# Patient Record
Sex: MALE | Race: WHITE | Employment: OTHER | URBAN - METROPOLITAN AREA
[De-identification: names, ages, dates, MRNs, and addresses within clinical notes are randomized per-mention and may not be internally consistent; named-entity substitution may affect disease eponyms.]

---

## 2023-02-23 ENCOUNTER — HOSPITAL ENCOUNTER (EMERGENCY)
Age: 78
Discharge: HOME | DRG: 071 | End: 2023-02-24
Attending: EMERGENCY MEDICINE
Payer: MEDICARE

## 2023-02-23 ENCOUNTER — APPOINTMENT (OUTPATIENT)
Dept: CT IMAGING | Age: 78
DRG: 071 | End: 2023-02-23
Payer: MEDICARE

## 2023-02-23 VITALS
BODY MASS INDEX: 25.73 KG/M2 | RESPIRATION RATE: 18 BRPM | HEART RATE: 89 BPM | DIASTOLIC BLOOD PRESSURE: 64 MMHG | WEIGHT: 190 LBS | HEIGHT: 72 IN | SYSTOLIC BLOOD PRESSURE: 148 MMHG | OXYGEN SATURATION: 99 % | TEMPERATURE: 98.6 F
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
## 2023-02-23 PROCEDURE — 99284 EMERGENCY DEPT VISIT MOD MDM: CPT

## 2023-02-23 PROCEDURE — 70450 CT HEAD/BRAIN W/O DYE: CPT

## 2023-02-23 PROCEDURE — 72125 CT NECK SPINE W/O DYE: CPT

## 2023-02-23 ASSESSMENT — ENCOUNTER SYMPTOMS
COUGH: 0
VOMITING: 0
SHORTNESS OF BREATH: 0

## 2023-02-23 ASSESSMENT — PAIN - FUNCTIONAL ASSESSMENT: PAIN_FUNCTIONAL_ASSESSMENT: NONE - DENIES PAIN

## 2023-02-24 ENCOUNTER — APPOINTMENT (OUTPATIENT)
Dept: CT IMAGING | Age: 78
DRG: 071 | End: 2023-02-24
Payer: MEDICARE

## 2023-02-24 ENCOUNTER — APPOINTMENT (OUTPATIENT)
Dept: GENERAL RADIOLOGY | Age: 78
DRG: 071 | End: 2023-02-24
Payer: MEDICARE

## 2023-02-24 ENCOUNTER — HOSPITAL ENCOUNTER (INPATIENT)
Age: 78
LOS: 2 days | Discharge: ANOTHER HOSPITAL | DRG: 071 | End: 2023-02-26
Attending: INTERNAL MEDICINE
Payer: MEDICARE

## 2023-02-24 DIAGNOSIS — D72.829 LEUKOCYTOSIS, UNSPECIFIED TYPE: ICD-10-CM

## 2023-02-24 DIAGNOSIS — R41.82 ALTERED MENTAL STATUS, UNSPECIFIED ALTERED MENTAL STATUS TYPE: Primary | ICD-10-CM

## 2023-02-24 DIAGNOSIS — R77.8 ELEVATED TROPONIN: ICD-10-CM

## 2023-02-24 LAB
ALBUMIN SERPL-MCNC: 3.7 G/DL (ref 3.5–4.6)
ALP BLD-CCNC: 146 U/L (ref 35–104)
ALT SERPL-CCNC: 16 U/L (ref 0–41)
AMMONIA: 49 UMOL/L (ref 16–60)
ANION GAP SERPL CALCULATED.3IONS-SCNC: 14 MEQ/L (ref 9–15)
ANISOCYTOSIS: ABNORMAL
APTT: 29.3 SEC (ref 24.4–36.8)
AST SERPL-CCNC: 28 U/L (ref 0–40)
BANDED NEUTROPHILS RELATIVE PERCENT: 9 %
BASE EXCESS ARTERIAL: 2 (ref -3–3)
BASOPHILS ABSOLUTE: 0 K/UL (ref 0–0.2)
BASOPHILS RELATIVE PERCENT: 1.2 %
BILIRUB SERPL-MCNC: 0.5 MG/DL (ref 0.2–0.7)
BILIRUBIN URINE: NEGATIVE
BLOOD, URINE: NEGATIVE
BUN BLDV-MCNC: 18 MG/DL (ref 8–23)
CALCIUM IONIZED: 1.21 MMOL/L (ref 1.12–1.32)
CALCIUM SERPL-MCNC: 9.3 MG/DL (ref 8.5–9.9)
CHLORIDE BLD-SCNC: 98 MEQ/L (ref 95–107)
CHP ED QC CHECK: NORMAL
CLARITY: CLEAR
CO2: 27 MEQ/L (ref 20–31)
COLOR: YELLOW
CREAT SERPL-MCNC: 1.02 MG/DL (ref 0.7–1.2)
EKG ATRIAL RATE: 102 BPM
EKG P AXIS: 36 DEGREES
EKG P-R INTERVAL: 142 MS
EKG Q-T INTERVAL: 332 MS
EKG QRS DURATION: 94 MS
EKG QTC CALCULATION (BAZETT): 432 MS
EKG R AXIS: -15 DEGREES
EKG T AXIS: 35 DEGREES
EKG VENTRICULAR RATE: 102 BPM
EOSINOPHILS ABSOLUTE: 0 K/UL (ref 0–0.7)
EOSINOPHILS RELATIVE PERCENT: 0.2 %
ETHANOL PERCENT: NORMAL G/DL
ETHANOL: <10 MG/DL (ref 0–0.08)
GFR SERPL CREATININE-BSD FRML MDRD: >60 ML/MIN/{1.73_M2}
GFR SERPL CREATININE-BSD FRML MDRD: >60 ML/MIN/{1.73_M2}
GLOBULIN: 3.2 G/DL (ref 2.3–3.5)
GLUCOSE BLD-MCNC: 148 MG/DL (ref 70–99)
GLUCOSE BLD-MCNC: 154 MG/DL (ref 70–99)
GLUCOSE BLD-MCNC: 175 MG/DL
GLUCOSE BLD-MCNC: 175 MG/DL (ref 70–99)
GLUCOSE BLD-MCNC: 189 MG/DL (ref 70–99)
GLUCOSE URINE: NEGATIVE MG/DL
HCO3 ARTERIAL: 26.2 MMOL/L (ref 21–29)
HCT VFR BLD CALC: 44.1 % (ref 42–52)
HEMOGLOBIN: 14.5 G/DL (ref 14–18)
HEMOGLOBIN: 14.6 GM/DL (ref 13.5–17.5)
INR BLD: 1
KETONES, URINE: ABNORMAL MG/DL
LACTATE: 1.04 MMOL/L (ref 0.4–2)
LACTIC ACID: 2.4 MMOL/L (ref 0.5–2.2)
LEUKOCYTE ESTERASE, URINE: NEGATIVE
LYMPHOCYTES ABSOLUTE: 1 K/UL (ref 1–4.8)
LYMPHOCYTES RELATIVE PERCENT: 4 %
MCH RBC QN AUTO: 29.1 PG (ref 27–31.3)
MCHC RBC AUTO-ENTMCNC: 32.9 % (ref 33–37)
MCV RBC AUTO: 88.4 FL (ref 79–92.2)
MICROCYTES: ABNORMAL
MONOCYTES ABSOLUTE: 1 K/UL (ref 0.2–0.8)
MONOCYTES RELATIVE PERCENT: 3.8 %
NEUTROPHILS ABSOLUTE: 22.1 K/UL (ref 1.4–6.5)
NEUTROPHILS RELATIVE PERCENT: 84 %
NITRITE, URINE: NEGATIVE
O2 SAT, ARTERIAL: 96 % (ref 93–100)
PCO2 ARTERIAL: 38 MM HG (ref 35–45)
PDW BLD-RTO: 14.8 % (ref 11.5–14.5)
PERFORMED ON: ABNORMAL
PH ARTERIAL: 7.45 (ref 7.35–7.45)
PH UA: 6.5 (ref 5–9)
PLATELET # BLD: 399 K/UL (ref 130–400)
PLATELET SLIDE REVIEW: ABNORMAL
PO2 ARTERIAL: 80 MM HG (ref 75–108)
POC CHLORIDE: 105 MEQ/L (ref 99–110)
POC CREATININE: 1 MG/DL (ref 0.8–1.3)
POC FIO2: 4
POC HEMATOCRIT: 43 % (ref 41–53)
POC POTASSIUM: 3.3 MEQ/L (ref 3.5–5.1)
POC SAMPLE TYPE: ABNORMAL
POC SODIUM: 140 MEQ/L (ref 136–145)
POTASSIUM SERPL-SCNC: 3.9 MEQ/L (ref 3.4–4.9)
PROCALCITONIN: 0.19 NG/ML (ref 0–0.15)
PROTEIN UA: ABNORMAL MG/DL
PROTHROMBIN TIME: 13.6 SEC (ref 12.3–14.9)
RBC # BLD: 4.98 M/UL (ref 4.7–6.1)
SODIUM BLD-SCNC: 139 MEQ/L (ref 135–144)
SPECIFIC GRAVITY UA: 1.02 (ref 1–1.03)
TCO2 ARTERIAL: 27 MMOL/L (ref 21–32)
TOTAL PROTEIN: 6.9 G/DL (ref 6.3–8)
TROPONIN: 0.01 NG/ML (ref 0–0.01)
TROPONIN: 0.02 NG/ML (ref 0–0.01)
UROBILINOGEN, URINE: 0.2 E.U./DL
VALPROIC ACID LEVEL: 29.5 UG/ML (ref 50–100)
WBC # BLD: 23.8 K/UL (ref 4.8–10.8)

## 2023-02-24 PROCEDURE — 80053 COMPREHEN METABOLIC PANEL: CPT

## 2023-02-24 PROCEDURE — P9612 CATHETERIZE FOR URINE SPEC: HCPCS

## 2023-02-24 PROCEDURE — 70498 CT ANGIOGRAPHY NECK: CPT

## 2023-02-24 PROCEDURE — 6830039000 HC L3 TRAUMA ALERT

## 2023-02-24 PROCEDURE — 80164 ASSAY DIPROPYLACETIC ACD TOT: CPT

## 2023-02-24 PROCEDURE — 83605 ASSAY OF LACTIC ACID: CPT

## 2023-02-24 PROCEDURE — 2580000003 HC RX 258: Performed by: INTERNAL MEDICINE

## 2023-02-24 PROCEDURE — 82803 BLOOD GASES ANY COMBINATION: CPT

## 2023-02-24 PROCEDURE — 85025 COMPLETE CBC W/AUTO DIFF WBC: CPT

## 2023-02-24 PROCEDURE — 85610 PROTHROMBIN TIME: CPT

## 2023-02-24 PROCEDURE — 2580000003 HC RX 258: Performed by: EMERGENCY MEDICINE

## 2023-02-24 PROCEDURE — 93010 ELECTROCARDIOGRAM REPORT: CPT | Performed by: INTERNAL MEDICINE

## 2023-02-24 PROCEDURE — 82330 ASSAY OF CALCIUM: CPT

## 2023-02-24 PROCEDURE — 2580000003 HC RX 258: Performed by: PHYSICIAN ASSISTANT

## 2023-02-24 PROCEDURE — 70450 CT HEAD/BRAIN W/O DYE: CPT

## 2023-02-24 PROCEDURE — 84484 ASSAY OF TROPONIN QUANT: CPT

## 2023-02-24 PROCEDURE — 70496 CT ANGIOGRAPHY HEAD: CPT

## 2023-02-24 PROCEDURE — 87040 BLOOD CULTURE FOR BACTERIA: CPT

## 2023-02-24 PROCEDURE — 82140 ASSAY OF AMMONIA: CPT

## 2023-02-24 PROCEDURE — 99285 EMERGENCY DEPT VISIT HI MDM: CPT

## 2023-02-24 PROCEDURE — 84295 ASSAY OF SERUM SODIUM: CPT

## 2023-02-24 PROCEDURE — 82435 ASSAY OF BLOOD CHLORIDE: CPT

## 2023-02-24 PROCEDURE — 85730 THROMBOPLASTIN TIME PARTIAL: CPT

## 2023-02-24 PROCEDURE — 93005 ELECTROCARDIOGRAM TRACING: CPT | Performed by: PHYSICIAN ASSISTANT

## 2023-02-24 PROCEDURE — 6370000000 HC RX 637 (ALT 250 FOR IP): Performed by: PHYSICIAN ASSISTANT

## 2023-02-24 PROCEDURE — 84132 ASSAY OF SERUM POTASSIUM: CPT

## 2023-02-24 PROCEDURE — 84145 PROCALCITONIN (PCT): CPT

## 2023-02-24 PROCEDURE — 1210000000 HC MED SURG R&B

## 2023-02-24 PROCEDURE — 6360000004 HC RX CONTRAST MEDICATION: Performed by: PHYSICIAN ASSISTANT

## 2023-02-24 PROCEDURE — 36415 COLL VENOUS BLD VENIPUNCTURE: CPT

## 2023-02-24 PROCEDURE — 6360000002 HC RX W HCPCS: Performed by: PHYSICIAN ASSISTANT

## 2023-02-24 PROCEDURE — 81003 URINALYSIS AUTO W/O SCOPE: CPT

## 2023-02-24 PROCEDURE — 71045 X-RAY EXAM CHEST 1 VIEW: CPT

## 2023-02-24 PROCEDURE — 82077 ASSAY SPEC XCP UR&BREATH IA: CPT

## 2023-02-24 PROCEDURE — 36600 WITHDRAWAL OF ARTERIAL BLOOD: CPT

## 2023-02-24 PROCEDURE — 82565 ASSAY OF CREATININE: CPT

## 2023-02-24 PROCEDURE — 85014 HEMATOCRIT: CPT

## 2023-02-24 RX ORDER — HALOPERIDOL 5 MG/1
5 TABLET ORAL 2 TIMES DAILY
COMMUNITY
Start: 2023-02-23

## 2023-02-24 RX ORDER — ALLOPURINOL 300 MG/1
300 TABLET ORAL DAILY
COMMUNITY

## 2023-02-24 RX ORDER — ACETAMINOPHEN 325 MG/1
650 TABLET ORAL EVERY 6 HOURS PRN
Status: DISCONTINUED | OUTPATIENT
Start: 2023-02-24 | End: 2023-02-26 | Stop reason: HOSPADM

## 2023-02-24 RX ORDER — ONDANSETRON 2 MG/ML
4 INJECTION INTRAMUSCULAR; INTRAVENOUS EVERY 6 HOURS PRN
Status: DISCONTINUED | OUTPATIENT
Start: 2023-02-24 | End: 2023-02-26 | Stop reason: HOSPADM

## 2023-02-24 RX ORDER — APIXABAN 2.5 MG/1
2.5 TABLET, FILM COATED ORAL 2 TIMES DAILY
COMMUNITY
Start: 2023-01-04

## 2023-02-24 RX ORDER — ACETAMINOPHEN 325 MG/1
650 TABLET ORAL EVERY 6 HOURS PRN
COMMUNITY
Start: 2023-02-20

## 2023-02-24 RX ORDER — SODIUM CHLORIDE 0.9 % (FLUSH) 0.9 %
5-40 SYRINGE (ML) INJECTION PRN
Status: DISCONTINUED | OUTPATIENT
Start: 2023-02-24 | End: 2023-02-26 | Stop reason: HOSPADM

## 2023-02-24 RX ORDER — INSULIN LISPRO 100 [IU]/ML
0-4 INJECTION, SOLUTION INTRAVENOUS; SUBCUTANEOUS NIGHTLY
Status: DISCONTINUED | OUTPATIENT
Start: 2023-02-24 | End: 2023-02-26 | Stop reason: HOSPADM

## 2023-02-24 RX ORDER — 0.9 % SODIUM CHLORIDE 0.9 %
30 INTRAVENOUS SOLUTION INTRAVENOUS ONCE
Status: COMPLETED | OUTPATIENT
Start: 2023-02-24 | End: 2023-02-24

## 2023-02-24 RX ORDER — POLYETHYLENE GLYCOL 3350 17 G/17G
17 POWDER, FOR SOLUTION ORAL DAILY PRN
Status: DISCONTINUED | OUTPATIENT
Start: 2023-02-24 | End: 2023-02-26 | Stop reason: HOSPADM

## 2023-02-24 RX ORDER — DEXTROSE MONOHYDRATE 100 MG/ML
INJECTION, SOLUTION INTRAVENOUS CONTINUOUS PRN
Status: DISCONTINUED | OUTPATIENT
Start: 2023-02-24 | End: 2023-02-26 | Stop reason: HOSPADM

## 2023-02-24 RX ORDER — DIVALPROEX SODIUM 125 MG/1
250 CAPSULE, COATED PELLETS ORAL 2 TIMES DAILY
COMMUNITY
Start: 2023-02-23

## 2023-02-24 RX ORDER — INSULIN LISPRO 100 [IU]/ML
0-4 INJECTION, SOLUTION INTRAVENOUS; SUBCUTANEOUS
Status: DISCONTINUED | OUTPATIENT
Start: 2023-02-25 | End: 2023-02-26 | Stop reason: HOSPADM

## 2023-02-24 RX ORDER — ASPIRIN 300 MG/1
300 SUPPOSITORY RECTAL ONCE
Status: COMPLETED | OUTPATIENT
Start: 2023-02-24 | End: 2023-02-24

## 2023-02-24 RX ORDER — TAMSULOSIN HYDROCHLORIDE 0.4 MG/1
0.4 CAPSULE ORAL NIGHTLY
COMMUNITY
Start: 2023-02-13 | End: 2023-03-15

## 2023-02-24 RX ORDER — ACETAMINOPHEN 650 MG/1
650 SUPPOSITORY RECTAL EVERY 6 HOURS PRN
Status: DISCONTINUED | OUTPATIENT
Start: 2023-02-24 | End: 2023-02-26 | Stop reason: HOSPADM

## 2023-02-24 RX ORDER — SODIUM CHLORIDE 0.9 % (FLUSH) 0.9 %
5-40 SYRINGE (ML) INJECTION EVERY 12 HOURS SCHEDULED
Status: DISCONTINUED | OUTPATIENT
Start: 2023-02-24 | End: 2023-02-26 | Stop reason: HOSPADM

## 2023-02-24 RX ORDER — SODIUM CHLORIDE 9 MG/ML
INJECTION, SOLUTION INTRAVENOUS PRN
Status: DISCONTINUED | OUTPATIENT
Start: 2023-02-24 | End: 2023-02-26 | Stop reason: HOSPADM

## 2023-02-24 RX ORDER — AMLODIPINE BESYLATE 5 MG/1
5 TABLET ORAL DAILY
COMMUNITY
Start: 2023-02-23

## 2023-02-24 RX ORDER — HYDRALAZINE HYDROCHLORIDE 25 MG/1
25 TABLET, FILM COATED ORAL EVERY 6 HOURS PRN
COMMUNITY
Start: 2023-02-23

## 2023-02-24 RX ORDER — CARVEDILOL 6.25 MG/1
6.25 TABLET ORAL 2 TIMES DAILY
COMMUNITY
Start: 2023-02-13

## 2023-02-24 RX ORDER — ENOXAPARIN SODIUM 100 MG/ML
40 INJECTION SUBCUTANEOUS DAILY
Status: DISCONTINUED | OUTPATIENT
Start: 2023-02-24 | End: 2023-02-25

## 2023-02-24 RX ORDER — ASPIRIN 81 MG/1
81 TABLET, CHEWABLE ORAL DAILY
COMMUNITY
Start: 2023-01-24

## 2023-02-24 RX ORDER — ONDANSETRON 4 MG/1
4 TABLET, ORALLY DISINTEGRATING ORAL EVERY 8 HOURS PRN
Status: DISCONTINUED | OUTPATIENT
Start: 2023-02-24 | End: 2023-02-26 | Stop reason: HOSPADM

## 2023-02-24 RX ORDER — LANOLIN ALCOHOL/MO/W.PET/CERES
3 CREAM (GRAM) TOPICAL NIGHTLY
COMMUNITY
Start: 2023-02-20

## 2023-02-24 RX ORDER — ATORVASTATIN CALCIUM 40 MG/1
40 TABLET, FILM COATED ORAL NIGHTLY
COMMUNITY
Start: 2023-01-03

## 2023-02-24 RX ORDER — SODIUM CHLORIDE, SODIUM LACTATE, POTASSIUM CHLORIDE, CALCIUM CHLORIDE 600; 310; 30; 20 MG/100ML; MG/100ML; MG/100ML; MG/100ML
INJECTION, SOLUTION INTRAVENOUS CONTINUOUS
Status: DISCONTINUED | OUTPATIENT
Start: 2023-02-24 | End: 2023-02-26 | Stop reason: HOSPADM

## 2023-02-24 RX ADMIN — SODIUM CHLORIDE 2586 ML: 9 INJECTION, SOLUTION INTRAVENOUS at 16:23

## 2023-02-24 RX ADMIN — SODIUM CHLORIDE, POTASSIUM CHLORIDE, SODIUM LACTATE AND CALCIUM CHLORIDE: 600; 310; 30; 20 INJECTION, SOLUTION INTRAVENOUS at 21:29

## 2023-02-24 RX ADMIN — VANCOMYCIN HYDROCHLORIDE 1000 MG: 1 INJECTION, POWDER, LYOPHILIZED, FOR SOLUTION INTRAVENOUS at 18:21

## 2023-02-24 RX ADMIN — ASPIRIN 300 MG: 300 SUPPOSITORY RECTAL at 16:26

## 2023-02-24 RX ADMIN — PIPERACILLIN AND TAZOBACTAM 3375 MG: 3; .375 INJECTION, POWDER, FOR SOLUTION INTRAVENOUS at 17:39

## 2023-02-24 RX ADMIN — IOPAMIDOL 75 ML: 612 INJECTION, SOLUTION INTRAVENOUS at 14:39

## 2023-02-24 ASSESSMENT — ENCOUNTER SYMPTOMS
ABDOMINAL DISTENTION: 0
SORE THROAT: 0
SHORTNESS OF BREATH: 0
CONSTIPATION: 0
RHINORRHEA: 0
EYE DISCHARGE: 0
COLOR CHANGE: 0
ABDOMINAL PAIN: 0

## 2023-02-24 ASSESSMENT — LIFESTYLE VARIABLES
HOW OFTEN DO YOU HAVE A DRINK CONTAINING ALCOHOL: NEVER
HOW MANY STANDARD DRINKS CONTAINING ALCOHOL DO YOU HAVE ON A TYPICAL DAY: PATIENT DOES NOT DRINK

## 2023-02-24 ASSESSMENT — PAIN DESCRIPTION - FREQUENCY: FREQUENCY: CONTINUOUS

## 2023-02-24 ASSESSMENT — PAIN DESCRIPTION - PAIN TYPE: TYPE: ACUTE PAIN

## 2023-02-24 ASSESSMENT — PAIN SCALES - GENERAL
PAINLEVEL_OUTOF10: 0
PAINLEVEL_OUTOF10: 0

## 2023-02-24 ASSESSMENT — PAIN - FUNCTIONAL ASSESSMENT: PAIN_FUNCTIONAL_ASSESSMENT: 0-10

## 2023-02-24 NOTE — ED NOTES
This nurse spoke with Nova Cleary regarding patient results, pending discharge, and ETA of pt's arrival to facility.       Rakesh Caban RN  02/24/23 8069

## 2023-02-24 NOTE — ED NOTES
Kellie Zamudio called from nursing facility to give report on patient. Per Kellie Zamudio, pt is a new admit to facility as of this evening from     New admit to facility. Per Kellie Zamudio, pt came from [facility] psych), pt has been \"up down, up down\" ambulating without walker. Pt was thought to be calm and resitn gin bed, however pt got OOB, shoving gloves and paper towels into toilet causing toilet to overflow resulting in pt slipping and falling on wet floor. Pt hit L side of head on floor. + Eliquis. NP advised ED evaluation. RFA skin tear prior to fall, L skin tear caused by fall. Pt a+ox2 at baseline. Hx dementia, DM, HTN, CAD.       Ben Quan RN  02/23/23 9769

## 2023-02-24 NOTE — ED TRIAGE NOTES
Pt arrived via [facility] from [facility] with co altered mental LNK 30 mins ago. Pt was seen here last night for a fall from standing. Pt responds to voice skin is pale and warm.

## 2023-02-24 NOTE — ED TRIAGE NOTES
Pt. Was walking out of the bathroom when he slipped on water and fell. Pt. His the left side of his head.

## 2023-02-24 NOTE — ED PROVIDER NOTES
[address] [facility] ED  eMERGENCY dEPARTMENT eNCOUnter      Pt Name: Kavita Burt  MRN: [id number]  [place] 1945  Date of evaluation: 2/24/2023  Provider: Saurabh Carrillo PA-C    CHIEF COMPLAINT       Chief Complaint   Patient presents with    Altered Mental Status         HISTORY OF PRESENT ILLNESS   (Location/Symptom, Timing/Onset,Context/Setting, Quality, Duration, Modifying Factors, Severity)  Note limiting factors. Kavita Burt is a 65 y.o. male who presents to the emergency department complaint of altered mental status according to nursing home staff that occurred within the last 30 minutes. Patient was seen in the ED yesterday for a fall, and discharged back to nursing facility around midnight last evening. Staff states that patient seemed to be fine until about 30 minutes ago where he is now seem to be less responsive, he will open his eyes to voice, he will talk intermittently, he seems to spontaneously move all extremities. There is been no new fall or injury. HPI    NursingNotes were reviewed. REVIEW OF SYSTEMS    (2-9 systems for level 4, 10 or more for level 5)     Review of Systems   Constitutional:  Negative for activity change and appetite change. HENT:  Negative for congestion, ear discharge, ear pain, nosebleeds, rhinorrhea and sore throat. Eyes:  Negative for discharge. Respiratory:  Negative for shortness of breath. Cardiovascular:  Negative for chest pain, palpitations and leg swelling. Gastrointestinal:  Negative for abdominal distention, abdominal pain and constipation. Genitourinary:  Negative for difficulty urinating and dysuria. Musculoskeletal:  Negative for arthralgias. Skin:  Negative for color change. Neurological:  Negative for dizziness, tremors, syncope, weakness, numbness and headaches. Altered mental status   Psychiatric/Behavioral:  Negative for agitation and confusion.       Except as noted above the remainder of the review of systems was reviewed and negative. PAST MEDICAL HISTORY     Past Medical History:   Diagnosis Date    Diabetes mellitus ([facility] [address]     Falls     Gout     Headache     Hyperlipidemia     PE (pulmonary thromboembolism) ([facility] [address]     Syncope and collapse          SURGICALHISTORY     History reviewed. No pertinent surgical history. CURRENT MEDICATIONS       Previous Medications    No medications on file            Patient has no known allergies. FAMILY HISTORY     History reviewed. No pertinent family history. SOCIAL HISTORY       Social History     Socioeconomic History    Marital status:      Spouse name: None    Number of children: None    Years of education: None    Highest education level: None       SCREENINGS    [place] Coma Scale  Eye Opening: None  Best Verbal Response: Confused  Best Motor Response: Withdraws from pain  [place] Coma Scale Score: 9        PHYSICAL EXAM    (up to 7 for level 4, 8 or more for level 5)     ED Triage Vitals   BP Temp Temp src Pulse Resp SpO2 Height Weight   -- -- -- -- -- -- -- --       Physical Exam  Vitals and nursing note reviewed. Constitutional:       General: He is not in acute distress. Appearance: He is well-developed. He is not ill-appearing, toxic-appearing or diaphoretic. HENT:      Head: Normocephalic. Comments: Head face and scalp atraumatic, no depressions, no deformity, no facial grimace on examination. Nose: No congestion. Mouth/Throat:      Mouth: Mucous membranes are moist.      Pharynx: No oropharyngeal exudate or posterior oropharyngeal erythema. Eyes:      Extraocular Movements: Extraocular movements intact. Conjunctiva/sclera: Conjunctivae normal.      Pupils: Pupils are equal, round, and reactive to light. Comments: Pupils are at approximately 2 mm, sluggish to respond, equal ocular movement. Neck:      Vascular: No JVD. Trachea: No tracheal deviation.    Cardiovascular:      Rate and Rhythm: Normal rate.      Pulses: Normal pulses. Heart sounds: Normal heart sounds. No murmur heard. No friction rub. No gallop. Pulmonary:      Effort: Pulmonary effort is normal. No tachypnea, accessory muscle usage, respiratory distress or retractions. Breath sounds: No stridor. No wheezing, rhonchi or rales. Comments: Lung sounds are clear in all fields, there is no wheezes rales or rhonchi, no accessory muscle use, no signs of distress. Chest:      Chest wall: No tenderness. Abdominal:      General: Abdomen is flat. Bowel sounds are normal. There is no distension or abdominal bruit. Palpations: There is no shifting dullness, fluid wave, hepatomegaly, splenomegaly, mass or pulsatile mass. Tenderness: There is no abdominal tenderness. There is no right CVA tenderness, left CVA tenderness, guarding or rebound. Negative signs include Esquivel's sign, Rovsing's sign and McBurney's sign. Comments: Abdomen soft nondistended nontender no guarding mass rebound, no CVA tenderness. Musculoskeletal:         General: No deformity. Cervical back: Normal range of motion and neck supple. No rigidity. Comments: She is moving extremities spontaneously, but does not follow commands. Skin:     General: Skin is warm and dry. Capillary Refill: Capillary refill takes less than 2 seconds. Coloration: Skin is not jaundiced. Neurological:      General: No focal deficit present. Mental Status: He is alert. GCS: GCS eye subscore is 4. GCS verbal subscore is 5. GCS motor subscore is 5. Cranial Nerves: No cranial nerve deficit. Sensory: No sensory deficit. Motor: No weakness. Coordination: Coordination normal.      Comments: Patient will open his eyes to verbal response, he does acknowledge his name, he does verbalize minimally, he otherwise does not answer any questions.     No facial droop is appreciated, unable to complete NIH stroke scale, as patient does not follow any commands. Psychiatric:         Mood and Affect: Mood normal.       RESULTS     EKG: All EKG's are interpreted by the Emergency Department Physician who either signs or Co-signsthis chart in the absence of a cardiologist.    EKG shows sinus tachycardia at 102 bpm no acute ST segment abnormality no ventricular ectopy QTc 432 ms    Repeat  EKG shows sinus tachycardia at 113 bpm no acute ST segment abnormality no ventricular ectopy QTc 469 ms          RADIOLOGY:   Non-plain filmimages such as CT, Ultrasound and MRI are read by the radiologist. Plain radiographic images are visualized and preliminarily interpreted by the emergency physician with the below findings:        Interpretation per the Radiologist below, if available at the time ofthis note:    XR CHEST PORTABLE   Final Result   No acute process. CT Head W/O Contrast   Final Result   1. There is no acute intracranial abnormality. Specifically, there is no   intracranial hemorrhage. 2. Atrophy and periventricular leukomalacia,         CTA HEAD W WO CONTRAST   Final Result   Addendum (preliminary) 1 of 1   ADDENDUM:   CTA neck now available which shows extensive atherosclerotic plaque at    level   of carotid bulbs and proximal right and left internal carotid arteries. 50%   stenosis involving proximal right internal carotid artery. Stenosis of   approximately 60% involving proximal left internal carotid artery. No   stenosis involving common carotid arteries. No stenosis or dissection   involving the vertebral arteries. Final   No intracranial arterial stenosis. IMPRESSION:   1. 50% stenosis involving proximal right internal carotid artery. 2. 60% stenosis involving proximal left internal carotid artery.          CTA NECK W WO CONTRAST   Final Result   Addendum (preliminary) 1 of 1   ADDENDUM:   CTA neck now available which shows extensive atherosclerotic plaque at    level   of carotid bulbs and proximal right and left internal carotid arteries. 50%   stenosis involving proximal right internal carotid artery. Stenosis of   approximately 60% involving proximal left internal carotid artery. No   stenosis involving common carotid arteries. No stenosis or dissection   involving the vertebral arteries. Final   No intracranial arterial stenosis. IMPRESSION:   1. 50% stenosis involving proximal right internal carotid artery. 2. 60% stenosis involving proximal left internal carotid artery.                ED BEDSIDE ULTRASOUND:   Performed by ED Physician - none    LABS:  Labs Reviewed   CBC WITH AUTO DIFFERENTIAL - Abnormal; Notable for the following components:       Result Value    WBC 23.8 (*)     MCHC 32.9 (*)     RDW 14.8 (*)     Neutrophils Absolute 22.1 (*)     Monocytes Absolute 1.0 (*)     All other components within normal limits   COMPREHENSIVE METABOLIC PANEL - Abnormal; Notable for the following components:    Glucose 189 (*)     Alkaline Phosphatase 146 (*)     All other components within normal limits   TROPONIN - Abnormal; Notable for the following components:    Troponin 0.017 (*)     All other components within normal limits    Narrative:     Wendy Gastelum tel. [phone],  TROP results called to and read back by Sweetie Honeycutt, 02/24/2023 14:17, by  Adin Jeffries - Abnormal; Notable for the following components:    Ketones, Urine TRACE (*)     Protein, UA TRACE (*)     All other components within normal limits   POCT GLUCOSE - Abnormal; Notable for the following components:    POC Glucose 175 (*)     All other components within normal limits   POCT ARTERIAL - Abnormal; Notable for the following components:    POC Potassium 3.3 (*)     POC Glucose 148 (*)     pO2, Arterial 80 (*)     O2 Sat, Arterial 96 (*)     All other components within normal limits   POCT GLUCOSE - Normal   CULTURE, BLOOD 1   CULTURE, BLOOD 2   ETHANOL   PROTIME-INR   APTT   TROPONIN   LACTIC ACID   LACTATE, SEPSIS LACTATE, SEPSIS   PROCALCITONIN   AMMONIA   VALPROIC ACID LEVEL, TOTAL   POCT EPOC BLOOD GAS, LACTIC ACID, ICA       All other labs were within normal range or not returned as of this dictation. EMERGENCY DEPARTMENT COURSE and DIFFERENTIAL DIAGNOSIS/MDM:   Vitals:    Vitals:    02/24/23 1600 02/24/23 1630 02/24/23 1700 02/24/23 1730   BP: 119/68 109/67 104/62 (!) 93/58   Pulse: (!) 113 (!) 112 (!) 115 (!) 114   Resp: 24 22 23 24   Temp:    99.1 °F (37.3 °C)   TempSrc:       SpO2: 92% 97% 96% 97%   Weight:       Height:              Medical Decision Making  pt presented to the emergency department for concerns of altered mental status, according to nursing home staff this occurred 30 minutes prior to patient transfer to the ED. On arrival to the ED, patient will open his eyes to verbal stimuli, he will mumble his name, he does not follow commands well. CT scan of the head, and CTA of the head and cervical spine and are reported as below. Patient does have a white count of 23.8 thousand. For concerns of sepsis, he was started on sepsis protocol, Zosyn and vancomycin as well as IV fluids, BUN is 18, creatinine of 1.02 edema 139, potassium 3.9. Does have a mildly elevated troponin 0.017 but EKG shows no acute abnormalities. Shows no signs for urinary tract infection. 1615 reevaluation of patient at this time he is no longer responsive to verbal stimuli, he is not opening his eyes, he does not respond to painful stimuli. Review of previous charting including clinic shows that patient did have a cardiac arrest on the 10th of this month. At which time he was resuscitated, and transferred to nursing home for further treatment and rehabilitation. According to staff at nursing home patient was ambulatory up until transfer to ED today.     I did review this case with Dr. El Crawford, we reviewed patient's medications, there is a concern whether patient could potentially be overmedicated causing his altered mental status, responsiveness, as he is on Haldol, Depakote, and gabapentin.  Case was [facility], he will accept admission of this patient.    Amount and/or Complexity of Data Reviewed  Labs: ordered.  Radiology: ordered.  ECG/medicine tests: ordered.    Risk  OTC drugs.  Prescription drug management.  Decision regarding hospitalization.       Coding     CONSULTS:  IP CONSULT TO NEUROLOGY  IP CONSULT TO PSYCHIATRY  IP CONSULT TO PHARMACY  IP CONSULT TO PALLIATIVE CARE    PROCEDURES:  Unless otherwise noted below, none     Procedures    FINAL IMPRESSION      1. Altered mental status, unspecified altered mental status type    2. Leukocytosis, unspecified type    3. Elevated troponin          DISPOSITION/PLAN   DISPOSITION Admitted 02/24/2023 05:03:54 PM      PATIENT REFERRED TO:  No follow-up provider specified.    DISCHARGE MEDICATIONS:  New Prescriptions    No medications on file          (Please note that portions of this note were completed with a voice recognition program.  Efforts were made to edit the dictations but occasionally words are mis-transcribed.)    Ramez Padgett PA-C (electronically signed)  Attending Emergency Physician         Ramez Padgett PA-C  02/24/23 1650       Ramez Padgett PA-C  02/24/23 4726

## 2023-02-24 NOTE — ED PROVIDER NOTES
[facility] ED  eMERGENCY dEPARTMENT eNCOUnter      Pt Name: Phill Oseguera  MRN: [id number]  Birthdate 1945  Date of evaluation: 2/23/2023  Provider: Ramez Mariano MD    CHIEF COMPLAINT       Chief Complaint   Patient presents with    Fall         HISTORY OF PRESENT ILLNESS   (Location/Symptom, Timing/Onset,Context/Setting, Quality, Duration, Modifying Factors, Severity)  Note limiting factors.   Phill Oseguera is a 77 y.o. male who presents to the emergency department for evaluation after a fall at nursing home.  Patient has history of dementia.  He does not recall the injury.  Apparently he had put gloves in his toilet in his bathroom and tried to flush it causing it to overflow and slipped on the water.  Known whether there was loss of consciousness.  Acting at his baseline right now without any signs of serious injury.  Patient is on Eliquis.    HPI    NursingNotes were reviewed.    REVIEW OF SYSTEMS    (2-9 systems for level 4, 10 or more for level 5)     Review of Systems   Unable to perform ROS: Dementia   Constitutional:  Negative for chills.   HENT:  Negative for congestion.    Respiratory:  Negative for cough and shortness of breath.    Cardiovascular:  Negative for chest pain.   Gastrointestinal:  Negative for vomiting.   Endocrine: Negative for cold intolerance.   Genitourinary:  Negative for difficulty urinating.   Musculoskeletal:  Negative for arthralgias.   Skin:  Negative for pallor.   Allergic/Immunologic: Negative for immunocompromised state.   Neurological:  Negative for dizziness and syncope.   Psychiatric/Behavioral:  Negative for agitation and hallucinations.    All other systems reviewed and are negative.    Except as noted above the remainder of the review of systems was reviewed and negative.       PAST MEDICAL HISTORY   No past medical history on file.      SURGICALHISTORY     No past surgical history on file.      CURRENT MEDICATIONS       Previous Medications    No  medications on file       ALLERGIES     Patient has no allergy information on record. FAMILY HISTORY     No family history on file. SOCIAL HISTORY       Social History     Socioeconomic History    Marital status:        SCREENINGS    [place] Coma Scale  Eye Opening: Spontaneous  Best Verbal Response: Oriented  Best Motor Response: Obeys commands  [place] Coma Scale Score: 15 @FLOW([id number])@      PHYSICAL EXAM    (up to 7 for level 4, 8 or more for level 5)     ED Triage Vitals [02/23/23 2214]   BP Temp Temp Source Heart Rate Resp SpO2 Height Weight   (!) 148/64 98.6 °F (37 °C) Oral 89 18 99 % 6' (1.829 m) 190 lb (86.2 kg)       Physical Exam  Vitals and nursing note reviewed. Constitutional:       Appearance: He is well-developed. HENT:      Head: Normocephalic. Nose: Nose normal.   Eyes:      Conjunctiva/sclera: Conjunctivae normal.      Pupils: Pupils are equal, round, and reactive to light. Cardiovascular:      Rate and Rhythm: Normal rate and regular rhythm. Heart sounds: Normal heart sounds. Pulmonary:      Effort: Pulmonary effort is normal.      Breath sounds: Normal breath sounds. Abdominal:      General: Bowel sounds are normal.      Palpations: Abdomen is soft. Tenderness: There is no abdominal tenderness. There is no guarding. Musculoskeletal:         General: Normal range of motion. Cervical back: Normal range of motion and neck supple. Skin:     General: Skin is warm and dry. Capillary Refill: Capillary refill takes less than 2 seconds. Neurological:      General: No focal deficit present. Mental Status: He is alert. Mental status is at baseline.    Psychiatric:         Mood and Affect: Mood normal.       DIAGNOSTIC RESULTS     EKG: All EKG's are interpreted by the Emergency Department Physician who either signs or Co-signsthis chart in the absence of a cardiologist.      RADIOLOGY:   Loetta Corado such as CT, Ultrasound and MRI are read by the radiologist. Plain radiographic images are visualized and preliminarily interpreted by the emergency physician with the below findings:      Interpretation per the Radiologist below, if available at the time ofthis note:    [address]    (Results Pending)   [address]    (Results Pending)         ED BEDSIDE ULTRASOUND:   Performed by ED Physician - none    LABS:  Labs Reviewed - No data to display    All other labs were within normal range or not returned as of this dictation. EMERGENCY DEPARTMENT COURSE and DIFFERENTIAL DIAGNOSIS/MDM:   Vitals:    Vitals:    02/23/23 2214   BP: (!) 148/64   Pulse: 89   Resp: 18   Temp: 98.6 °F (37 °C)   TempSrc: Oral   SpO2: 99%   Weight: 190 lb (86.2 kg)   Height: 6' (1.829 m)          MDM    Scan of the head and C-spine are negative. Patient discharged back to the nursing home. No focal deficits on exam and no issues here with vomiting or acute behavior change    CONSULTS:  None    PROCEDURES:  Unless otherwise noted below, none     Procedures    FINAL IMPRESSION      1.  Fall, initial encounter          DISPOSITION/PLAN   DISPOSITION Decision To Discharge 02/24/2023 12:06:04 AM      PATIENT REFERRED TO:  Carl Ren MD  [address] [place] [id number]    In 1 week      DISCHARGE MEDICATIONS:  New Prescriptions    No medications on file          (Please note that portions of this note were completed with a voice recognition program.  Efforts were made to edit the dictations but occasionally words are mis-transcribed.)    Cheli Dudley MD (electronically signed)  Attending Emergency Physician         Cheli Dudley MD  02/24/23 3914

## 2023-02-24 NOTE — H&P
Patient is unable to provide history. All information was obtained from the wife at bedside and the ER physician assistant. Patient is from nursing home does not use oxygen usually alert and oriented but has history of dementia which was diagnosed 2 months ago. Patient also has history of DVT on Eliquis. Patient was recently here yesterday at the ER for a fall and was discharged. Comes in from the nursing home for altered mental status. Patient is nonverbal eyes are closed he will talk intermittently and seems to 20s move his extremities, urine has been negative ABG is within normal limits. CT head was negative for acute pathology showing atrophy and periventricular leukomalacia    Past Medical History:   Diagnosis Date    Diabetes mellitus ([facility] [address]     Falls     Gout     Headache     Hyperlipidemia     PE (pulmonary thromboembolism) ([facility])     Syncope and collapse      History reviewed. No pertinent surgical history. Social History     Socioeconomic History    Marital status:      Spouse name: Not on file    Number of children: Not on file    Years of education: Not on file    Highest education level: Not on file   Occupational History    Not on file   Tobacco Use    Smoking status: Not on file    Smokeless tobacco: Not on file   Substance and Sexual Activity    Alcohol use: Not on file    Drug use: Not on file    Sexual activity: Not on file   Other Topics Concern    Not on file   Social History Narrative    Not on file     Social Determinants of Health     Financial Resource Strain: Not on file   Food Insecurity: Not on file   Transportation Needs: Not on file   Physical Activity: Not on file   Stress: Not on file   Social Connections: Not on file   Intimate Partner Violence: Not on file   Housing Stability: Not on file     History reviewed. No pertinent family history. No current facility-administered medications on file prior to encounter.      No current outpatient medications on file prior to encounter. Lab Results   Component Value Date    WBC 23.8 (H) 02/24/2023    HGB 14.6 02/24/2023    HCT 44.1 02/24/2023    MCV 88.4 02/24/2023     02/24/2023     Lab Results   Component Value Date/Time     02/24/2023 01:15 PM    K 3.9 02/24/2023 01:15 PM    CL 98 02/24/2023 01:15 PM    CO2 27 02/24/2023 01:15 PM    BUN 18 02/24/2023 01:15 PM    CREATININE 1.0 02/24/2023 04:27 PM    CREATININE 1.02 02/24/2023 01:15 PM    GLUCOSE 189 02/24/2023 01:15 PM    CALCIUM 9.3 02/24/2023 01:15 PM    ROS: 12 point review system cannot be obtained due to acuity of medical condition  HEENT: AT/NC, PERRLA, no JVD  Neck: supple and midline  HEART: s1/s2 wnl w/o s3, no m.r.g  LUNG: clear, no wheez  ABD: soft, NT, ND  EXT: no edema  SKin : no rash  Neuro:non responsive  1) AMS, leukocytosis unspecifief type  2) elevated trop  3) psych d/o  4) carotid vascular dz  Admit to inpt  Spectrum antibiotic  N.p.o.   Swallow eval  Psych and neuro evaluation  IV fluids  Blood culture  Telemetry  Trend cardiac enzymes  Patient is a full code  Valproic acid level  Ammonia level  Procalcitonin level  Consider EEG  Reconcile home meds  Neuro eval

## 2023-02-25 ENCOUNTER — APPOINTMENT (OUTPATIENT)
Dept: GENERAL RADIOLOGY | Age: 78
DRG: 071 | End: 2023-02-25
Payer: MEDICARE

## 2023-02-25 PROBLEM — J12.3 HUMAN METAPNEUMOVIRUS (HMPV) PNEUMONIA: Status: ACTIVE | Noted: 2023-02-25

## 2023-02-25 PROBLEM — D72.829 LEUKOCYTOSIS: Status: ACTIVE | Noted: 2023-02-25

## 2023-02-25 LAB
ADENOVIRUS BY PCR: NOT DETECTED
ALBUMIN SERPL-MCNC: 2.9 G/DL (ref 3.5–4.6)
ALP BLD-CCNC: 126 U/L (ref 35–104)
ALT SERPL-CCNC: 13 U/L (ref 0–41)
ANION GAP SERPL CALCULATED.3IONS-SCNC: 11 MEQ/L (ref 9–15)
AST SERPL-CCNC: 19 U/L (ref 0–40)
BASOPHILS ABSOLUTE: 0.1 K/UL (ref 0–0.2)
BASOPHILS RELATIVE PERCENT: 0.5 %
BILIRUB SERPL-MCNC: 0.6 MG/DL (ref 0.2–0.7)
BORDETELLA PARAPERTUSSIS BY PCR: NOT DETECTED
BORDETELLA PERTUSSIS BY PCR: NOT DETECTED
BUN BLDV-MCNC: 16 MG/DL (ref 8–23)
CALCIUM SERPL-MCNC: 8.8 MG/DL (ref 8.5–9.9)
CHLAMYDOPHILIA PNEUMONIAE BY PCR: NOT DETECTED
CHLORIDE BLD-SCNC: 108 MEQ/L (ref 95–107)
CO2: 24 MEQ/L (ref 20–31)
CORONAVIRUS 229E BY PCR: NOT DETECTED
CORONAVIRUS HKU1 BY PCR: NOT DETECTED
CORONAVIRUS NL63 BY PCR: NOT DETECTED
CORONAVIRUS OC43 BY PCR: NOT DETECTED
CREAT SERPL-MCNC: 0.87 MG/DL (ref 0.7–1.2)
EOSINOPHILS ABSOLUTE: 0 K/UL (ref 0–0.7)
EOSINOPHILS RELATIVE PERCENT: 0.1 %
GFR SERPL CREATININE-BSD FRML MDRD: >60 ML/MIN/{1.73_M2}
GLOBULIN: 3.1 G/DL (ref 2.3–3.5)
GLUCOSE BLD-MCNC: 121 MG/DL (ref 70–99)
GLUCOSE BLD-MCNC: 124 MG/DL (ref 70–99)
GLUCOSE BLD-MCNC: 133 MG/DL (ref 70–99)
GLUCOSE BLD-MCNC: 138 MG/DL (ref 70–99)
GLUCOSE BLD-MCNC: 145 MG/DL (ref 70–99)
HCT VFR BLD CALC: 39.9 % (ref 42–52)
HEMOGLOBIN: 13.3 G/DL (ref 14–18)
HUMAN METAPNEUMOVIRUS BY PCR: DETECTED
HUMAN RHINOVIRUS/ENTEROVIRUS BY PCR: NOT DETECTED
INFLUENZA A BY PCR: NOT DETECTED
INFLUENZA B BY PCR: NOT DETECTED
LYMPHOCYTES ABSOLUTE: 1.2 K/UL (ref 1–4.8)
LYMPHOCYTES RELATIVE PERCENT: 5.8 %
MCH RBC QN AUTO: 29.5 PG (ref 27–31.3)
MCHC RBC AUTO-ENTMCNC: 33.3 % (ref 33–37)
MCV RBC AUTO: 88.6 FL (ref 79–92.2)
MONOCYTES ABSOLUTE: 1.3 K/UL (ref 0.2–0.8)
MONOCYTES RELATIVE PERCENT: 6 %
MYCOPLASMA PNEUMONIAE BY PCR: NOT DETECTED
NEUTROPHILS ABSOLUTE: 18.4 K/UL (ref 1.4–6.5)
NEUTROPHILS RELATIVE PERCENT: 87.6 %
PARAINFLUENZA VIRUS 1 BY PCR: NOT DETECTED
PARAINFLUENZA VIRUS 2 BY PCR: NOT DETECTED
PARAINFLUENZA VIRUS 3 BY PCR: NOT DETECTED
PARAINFLUENZA VIRUS 4 BY PCR: NOT DETECTED
PDW BLD-RTO: 14.9 % (ref 11.5–14.5)
PERFORMED ON: ABNORMAL
PLATELET # BLD: 314 K/UL (ref 130–400)
POTASSIUM SERPL-SCNC: 3.7 MEQ/L (ref 3.4–4.9)
PROCALCITONIN: 0.23 NG/ML (ref 0–0.15)
RBC # BLD: 4.51 M/UL (ref 4.7–6.1)
RESPIRATORY SYNCYTIAL VIRUS BY PCR: NOT DETECTED
SARS-COV-2, PCR: NOT DETECTED
SODIUM BLD-SCNC: 143 MEQ/L (ref 135–144)
TOTAL PROTEIN: 6 G/DL (ref 6.3–8)
WBC # BLD: 21 K/UL (ref 4.8–10.8)

## 2023-02-25 PROCEDURE — 73502 X-RAY EXAM HIP UNI 2-3 VIEWS: CPT

## 2023-02-25 PROCEDURE — 2700000000 HC OXYGEN THERAPY PER DAY

## 2023-02-25 PROCEDURE — 6360000002 HC RX W HCPCS: Performed by: INTERNAL MEDICINE

## 2023-02-25 PROCEDURE — 99223 1ST HOSP IP/OBS HIGH 75: CPT | Performed by: INTERNAL MEDICINE

## 2023-02-25 PROCEDURE — 2580000003 HC RX 258: Performed by: INTERNAL MEDICINE

## 2023-02-25 PROCEDURE — 6370000000 HC RX 637 (ALT 250 FOR IP): Performed by: INTERNAL MEDICINE

## 2023-02-25 PROCEDURE — 85025 COMPLETE CBC W/AUTO DIFF WBC: CPT

## 2023-02-25 PROCEDURE — 99222 1ST HOSP IP/OBS MODERATE 55: CPT | Performed by: PSYCHIATRY & NEUROLOGY

## 2023-02-25 PROCEDURE — 80053 COMPREHEN METABOLIC PANEL: CPT

## 2023-02-25 PROCEDURE — 1210000000 HC MED SURG R&B

## 2023-02-25 PROCEDURE — 0202U NFCT DS 22 TRGT SARS-COV-2: CPT

## 2023-02-25 PROCEDURE — 36415 COLL VENOUS BLD VENIPUNCTURE: CPT

## 2023-02-25 RX ORDER — ENOXAPARIN SODIUM 100 MG/ML
1 INJECTION SUBCUTANEOUS 2 TIMES DAILY
Status: DISCONTINUED | OUTPATIENT
Start: 2023-02-25 | End: 2023-02-25

## 2023-02-25 RX ORDER — LANOLIN ALCOHOL/MO/W.PET/CERES
3 CREAM (GRAM) TOPICAL NIGHTLY
Status: DISCONTINUED | OUTPATIENT
Start: 2023-02-25 | End: 2023-02-26 | Stop reason: HOSPADM

## 2023-02-25 RX ORDER — ALLOPURINOL 300 MG/1
300 TABLET ORAL DAILY
Status: DISCONTINUED | OUTPATIENT
Start: 2023-02-25 | End: 2023-02-26 | Stop reason: HOSPADM

## 2023-02-25 RX ORDER — DIVALPROEX SODIUM 125 MG/1
250 CAPSULE, COATED PELLETS ORAL 2 TIMES DAILY
Status: DISCONTINUED | OUTPATIENT
Start: 2023-02-25 | End: 2023-02-26 | Stop reason: HOSPADM

## 2023-02-25 RX ORDER — ASPIRIN 81 MG/1
81 TABLET, CHEWABLE ORAL DAILY
Status: DISCONTINUED | OUTPATIENT
Start: 2023-02-25 | End: 2023-02-26 | Stop reason: HOSPADM

## 2023-02-25 RX ORDER — HALOPERIDOL 5 MG/ML
5 INJECTION INTRAMUSCULAR EVERY 6 HOURS PRN
Status: DISCONTINUED | OUTPATIENT
Start: 2023-02-25 | End: 2023-02-26 | Stop reason: HOSPADM

## 2023-02-25 RX ORDER — TAMSULOSIN HYDROCHLORIDE 0.4 MG/1
0.4 CAPSULE ORAL NIGHTLY
Status: DISCONTINUED | OUTPATIENT
Start: 2023-02-25 | End: 2023-02-26 | Stop reason: HOSPADM

## 2023-02-25 RX ADMIN — SODIUM CHLORIDE, PRESERVATIVE FREE 10 ML: 5 INJECTION INTRAVENOUS at 21:24

## 2023-02-25 RX ADMIN — DIVALPROEX SODIUM 250 MG: 125 CAPSULE, COATED PELLETS ORAL at 21:23

## 2023-02-25 RX ADMIN — VANCOMYCIN HYDROCHLORIDE 750 MG: 750 INJECTION, POWDER, LYOPHILIZED, FOR SOLUTION INTRAVENOUS at 08:56

## 2023-02-25 RX ADMIN — SODIUM CHLORIDE, POTASSIUM CHLORIDE, SODIUM LACTATE AND CALCIUM CHLORIDE: 600; 310; 30; 20 INJECTION, SOLUTION INTRAVENOUS at 10:10

## 2023-02-25 RX ADMIN — VANCOMYCIN HYDROCHLORIDE 750 MG: 750 INJECTION, POWDER, LYOPHILIZED, FOR SOLUTION INTRAVENOUS at 21:38

## 2023-02-25 RX ADMIN — TAMSULOSIN HYDROCHLORIDE 0.4 MG: 0.4 CAPSULE ORAL at 21:23

## 2023-02-25 RX ADMIN — Medication 3 MG: at 21:23

## 2023-02-25 RX ADMIN — APIXABAN 2.5 MG: 2.5 TABLET, FILM COATED ORAL at 21:23

## 2023-02-25 RX ADMIN — ASPIRIN 81 MG 81 MG: 81 TABLET ORAL at 10:17

## 2023-02-25 RX ADMIN — DIVALPROEX SODIUM 250 MG: 125 CAPSULE, COATED PELLETS ORAL at 10:16

## 2023-02-25 RX ADMIN — PIPERACILLIN AND TAZOBACTAM 3375 MG: 3; .375 INJECTION, POWDER, FOR SOLUTION INTRAVENOUS at 18:21

## 2023-02-25 RX ADMIN — SODIUM CHLORIDE, POTASSIUM CHLORIDE, SODIUM LACTATE AND CALCIUM CHLORIDE: 600; 310; 30; 20 INJECTION, SOLUTION INTRAVENOUS at 21:36

## 2023-02-25 RX ADMIN — PIPERACILLIN AND TAZOBACTAM 3375 MG: 3; .375 INJECTION, POWDER, FOR SOLUTION INTRAVENOUS at 03:08

## 2023-02-25 RX ADMIN — ENOXAPARIN SODIUM 80 MG: 100 INJECTION SUBCUTANEOUS at 08:57

## 2023-02-25 RX ADMIN — PIPERACILLIN AND TAZOBACTAM 3375 MG: 3; .375 INJECTION, POWDER, FOR SOLUTION INTRAVENOUS at 10:12

## 2023-02-25 RX ADMIN — ALLOPURINOL 300 MG: 300 TABLET ORAL at 10:16

## 2023-02-25 ASSESSMENT — PAIN SCALES - GENERAL
PAINLEVEL_OUTOF10: 0
PAINLEVEL_OUTOF10: 4
PAINLEVEL_OUTOF10: 0

## 2023-02-25 ASSESSMENT — PAIN DESCRIPTION - PAIN TYPE: TYPE: ACUTE PAIN

## 2023-02-25 NOTE — CONSULTS
PSYCHIATRY ATTENDING CONSULT    REASON FOR CONSULT: \"multiple psych meds, ? s/E of psych meds\"    REQUESTING PHYSICIAN:  Dr. Asad Elias: \"I bumped my head against the wall in the room and got semi-knocked out\"    HISTORY OF PRESENT ILLNESS:  Adele Lockhart  is a 65 y.o. male with a history of dementia who was admitted on 2/24/2023 due to altered mental status. Consult was requested as above. When interviewed today (with his grandchildren present, with the patient's approval and per his request), the patient said he \"bumped his head against the wall and was semi-knocked out\" (he actually had been to the ER for a fall the day prior to admission and was discharged). He knew he was in a hospital. He said he had come here [place]  day or two ago\". He said he had not been told anything else about his condition. He said today was \"Saturday\" but the month was \"September\" and the season was 'fall' (even when I pointed out there was snow outside). He said the year was \"3708\", that he was born in \"1995\" and that his age was \"[age]\". He said [facility] in [facility]. He said his mood was \"pretty good\". He denied having any suicidal or homicidal ideation. He denied having any auditory or visual hallucinations. He denied paranoia or other delusions. He did at some point say that his granddaughter, who was present, was his \"daughter\", but when she corrected him, he admitted that she was his granddaughter. PAST PSYCHIATRIC HISTORY: patient denied; there is though documented history of dementia with behavioral disturbances     PAST MEDICAL HISTORY:       Diagnosis Date    Diabetes mellitus ([facility]     Falls     Gout     Headache     Hyperlipidemia     PE (pulmonary thromboembolism) ([facility]     Syncope and collapse          PAST SURGICAL HISTORY: History reviewed. No pertinent surgical history.     MEDICATIONS: Current Facility-Administered Medications: apixaban (ELIQUIS) tablet 2.5 mg, 2.5 mg, Oral, BID  melatonin tablet 3 mg, 3 mg, Oral, Nightly  tamsulosin (FLOMAX) capsule 0.4 mg, 0.4 mg, Oral, Nightly  aspirin chewable tablet 81 mg, 81 mg, Oral, Daily  allopurinol (ZYLOPRIM) tablet 300 mg, 300 mg, Oral, Daily  divalproex (DEPAKOTE SPRINKLE) DR capsule 250 mg, 250 mg, Oral, BID  [Held by provider] haloperidol lactate (HALDOL) injection 5 mg, 5 mg, IntraMUSCular, Q6H PRN  sodium chloride flush 0.9 % injection 5-40 mL, 5-40 mL, IntraVENous, 2 times per day  sodium chloride flush 0.9 % injection 5-40 mL, 5-40 mL, IntraVENous, PRN  0.9 % sodium chloride infusion, , IntraVENous, PRN  ondansetron (ZOFRAN-ODT) disintegrating tablet 4 mg, 4 mg, Oral, Q8H PRN **OR** ondansetron (ZOFRAN) injection 4 mg, 4 mg, IntraVENous, Q6H PRN  polyethylene glycol (GLYCOLAX) packet 17 g, 17 g, Oral, Daily PRN  acetaminophen (TYLENOL) tablet 650 mg, 650 mg, Oral, Q6H PRN **OR** acetaminophen (TYLENOL) suppository 650 mg, 650 mg, Rectal, Q6H PRN  piperacillin-tazobactam (ZOSYN) 3,375 mg in sodium chloride 0.9 % 50 mL IVPB (mini-bag), 3,375 mg, IntraVENous, Q8H  vancomycin (VANCOCIN) 750 mg in sodium chloride 0.9 % 250 mL IVPB, 750 mg, IntraVENous, Q12H  lactated ringers IV soln infusion, , IntraVENous, Continuous  insulin lispro (HUMALOG) injection vial 0-4 Units, 0-4 Units, SubCUTAneous, TID WC  insulin lispro (HUMALOG) injection vial 0-4 Units, 0-4 Units, SubCUTAneous, Nightly  glucose chewable tablet 16 g, 4 tablet, Oral, PRN  dextrose bolus 10% 125 mL, 125 mL, IntraVENous, PRN **OR** dextrose bolus 10% 250 mL, 250 mL, IntraVENous, PRN  glucagon (rDNA) injection 1 mg, 1 mg, SubCUTAneous, PRN  dextrose 10 % infusion, , IntraVENous, Continuous PRN  vancomycin (VANCOCIN) intermittent dosing (placeholder), , Other, RX Placeholder    ALLERGIES:  Patient has no known allergies. SOCIAL HISTORY: He said he was born and raised in [facility] [address] [employer] [place], [place]. He said he has a bachelor's and a master's degree in Accounting.  He was  for 28 years; he said that was his first marriage. He said he has one daughter, and 3 grandchildren. He lives with hsi wife. He is retired. He denied having any guns at home. SUBSTANCE ABUSE HISTORY: he said he does not smoke cigarettes, denied drinking alcohol or using illegal drugs.      VITALS:   Vitals:    02/25/23 1442   BP:    Pulse:    Resp: 18   Temp:    SpO2:        LABS:   Admission on 02/24/2023   Component Date Value Ref Range Status    WBC 02/24/2023 23.8 (A)  4.8 - 10.8 K/uL Final    RBC 02/24/2023 4.98  4.70 - 6.10 M/uL Final    Hemoglobin 02/24/2023 14.5  14.0 - 18.0 g/dL Final    Hematocrit 02/24/2023 44.1  42.0 - 52.0 % Final    MCV 02/24/2023 88.4  79.0 - 92.2 fL Final    MCH 02/24/2023 29.1  27.0 - 31.3 pg Final    MCHC 02/24/2023 32.9 (A)  33.0 - 37.0 % Final    RDW 02/24/2023 14.8 (A)  11.5 - 14.5 % Final    Platelets 91/37/7516 399  130 - 400 K/uL Final    PLATELET SLIDE REVIEW 02/24/2023 Increased   Final    Neutrophils % 02/24/2023 84.0  % Final    Lymphocytes % 02/24/2023 4.0  % Final    Monocytes % 02/24/2023 3.8  % Final    Eosinophils % 02/24/2023 0.2  % Final    Basophils % 02/24/2023 1.2  % Final    Neutrophils Absolute 02/24/2023 22.1 (A)  1.4 - 6.5 K/uL Final    Lymphocytes Absolute 02/24/2023 1.0  1.0 - 4.8 K/uL Final    Monocytes Absolute 02/24/2023 1.0 (A)  0.2 - 0.8 K/uL Final    Eosinophils Absolute 02/24/2023 0.0  0.0 - 0.7 K/uL Final    Basophils Absolute 02/24/2023 0.0  0.0 - 0.2 K/uL Final    Bands Relative 02/24/2023 9  % Final    Anisocytosis 02/24/2023 1+   Final    Microcytes 02/24/2023 1+   Final    Sodium 02/24/2023 139  135 - 144 mEq/L Final    Potassium 02/24/2023 3.9  3.4 - 4.9 mEq/L Final    Chloride 02/24/2023 98  95 - 107 mEq/L Final    CO2 02/24/2023 27  20 - 31 mEq/L Final    Anion Gap 02/24/2023 14  9 - 15 mEq/L Final    Glucose 02/24/2023 189 (A)  70 - 99 mg/dL Final    BUN 02/24/2023 18  8 - 23 mg/dL Final    Creatinine 02/24/2023 1.02  0.70 - 1.20 mg/dL Final Est, Gl[place]aurie Filt Rate 02/24/2023 >60.0  >60 Final    Comment: Pediatric calculator link  [email or web address]_[email or web address]  Effective Oct 3, 2022  These results are not intended for use in patients  <25years of age. eGFR results are calculated without  a race factor using the 2021 CKD-EPI equation. Careful  clinical correlation is recommended, particularly when  comparing to results calculated using previous equations. The CKD-EPI equation is less accurate in patients with  extremes of muscle mass, extra-renal metabolism of  creatinine, excessive creatinine ingestion, or following  therapy that affects renal tubular secretion. Calcium 02/24/2023 9.3  8.5 - 9.9 mg/dL Final    Total Protein 02/24/2023 6.9  6.3 - 8.0 g/dL Final    Albumin 02/24/2023 3.7  3.5 - 4.6 g/dL Final    Total Bilirubin 02/24/2023 0.5  0.2 - 0.7 mg/dL Final    Alkaline Phosphatase 02/24/2023 146 (A)  35 - 104 U/L Final    ALT 02/24/2023 16  0 - 41 U/L Final    AST 02/24/2023 28  0 - 40 U/L Final    Globulin 02/24/2023 3.2  2.3 - 3.5 g/dL Final    Ethanol Lvl 02/24/2023 <10  mg/dL Final    Ethanol percent 02/24/2023 Not indicated  G/dL Final    Troponin 02/24/2023 0.017 (A)  0.000 - 0.010 ng/mL Final    Methodology by Troponin T.     Color, UA 02/24/2023 Yellow  Straw/Yellow Final    Clarity, UA 02/24/2023 Clear  Clear Final    Glucose, Ur 02/24/2023 Negative  Negative mg/dL Final    Bilirubin Urine 02/24/2023 Negative  Negative Final    Ketones, Urine 02/24/2023 TRACE (A)  Negative mg/dL Final    Specific Gravity, UA 02/24/2023 1.020  1.005 - 1.030 Final    Blood, Urine 02/24/2023 Negative  Negative Final    pH, UA 02/24/2023 6.5  5.0 - 9.0 Final    Protein, UA 02/24/2023 TRACE (A)  Negative mg/dL Final    Urobilinogen, Urine 02/24/2023 0.2  <2.0 E.U./dL Final    Nitrite, Urine 02/24/2023 Negative  Negative Final    Leukocyte Esterase, Urine 02/24/2023 Negative  Negative Final    Ventricular Rate 02/24/2023 102 BPM Final    Atrial Rate 02/24/2023 102  BPM Final    P-R Interval 02/24/2023 142  ms Final    QRS Duration 02/24/2023 94  ms Final    Q-T Interval 02/24/2023 332  ms Final    QTc Calculation (Bazett) 02/24/2023 432  ms Final    P Axis 02/24/2023 36  degrees Final    R Axis 02/24/2023 -15  degrees Final    T Axis 02/24/2023 35  degrees Final    Protime 02/24/2023 13.6  12.3 - 14.9 sec Final    INR 02/24/2023 1.0   Final    aPTT 02/24/2023 29.3  24.4 - 36.8 sec Final    Comment: Effective 11/4/2020:  Heparin Therapeutic Range: 64.0 - 98.0 seconds.      Glucose 02/24/2023 175  mg/dL Final    QC OK? 02/24/2023 ok   Final    POC Glucose 02/24/2023 175 (A)  70 - 99 mg/dl Final    Performed on 02/24/2023 ACCU-CHEK   Final    Troponin 02/24/2023 0.014 (A)  0.000 - 0.010 ng/mL Final    Methodology by Troponin T.    Ventricular Rate 02/24/2023 111  BPM Preliminary    Atrial Rate 02/24/2023 111  BPM Preliminary    P-R Interval 02/24/2023 136  ms Preliminary    QRS Duration 02/24/2023 106  ms Preliminary    Q-T Interval 02/24/2023 338  ms Preliminary    QTc Calculation (Bazett) 02/24/2023 459  ms Preliminary    P [place] 02/24/2023 53  degrees Preliminary    R Axis 02/24/2023 -15  degrees Preliminary    T Axis 02/24/2023 14  degrees Preliminary    Lactic Acid 02/24/2023 2.4 (A)  0.5 - 2.2 mmol/L Final    Ventricular Rate 02/24/2023 113  BPM Preliminary    Atrial Rate 02/24/2023 113  BPM Preliminary    P-R Interval 02/24/2023 138  ms Preliminary    QRS Duration 02/24/2023 94  ms Preliminary    Q-T Interval 02/24/2023 342  ms Preliminary    QTc Calculation (Bazett) 02/24/2023 469  ms Preliminary    P [place] 02/24/2023 23  degrees Preliminary    R Axis 02/24/2023 -21  degrees Preliminary    T Axis 02/24/2023 26  degrees Preliminary    Procalcitonin 02/24/2023 0.19 (A)  0.00 - 0.15 ng/mL Final    Comment: Suspected Sepsis:  Low likelihood of sepsis  <.50 ng/mL    Increased likelihood of sepsis 0.50-2.00 ng/mL  Antibiotics  encouraged    High risk of sepsis/shock   >2.00 ng/mL  Antibiotics strongly encouraged    Suspected Lower Respiratory Tract Infections:  Low likelihood of bacterial infection  <0.24 ng/mL    Increased likelihood of bacterial infection >0.24 ng/mL  Antibiotics encouraged    With successful antibiotic therapy, PCT levels should decrease  rapidly. (Half-life of 24 to 36 hours.)    Procalcitonin values from samples collected within the first  6 hours of systemic infection may still be low. Retesting may be indicated. Values from day 1 and day 4 can be entered into the Change in  Procalcitonin Calculator to determine the patient's  Mortality Risk Prognosis  ([email or web address]-pct-[email or web address]. com)    In healthy neonates, plasma Procalcitonin (PCT) concentrations  increase gradually after birth, reaching peak values at about  24 hours of age then decrease to normal values below 0.5                            ng/mL  by 48-72 hours of age. Ammonia 02/24/2023 49  16 - 60 umol/L Final    Valproic Acid Lvl 02/24/2023 29.5 (A)  50.0 - 100.0 ug/mL Final    POC Sodium 02/24/2023 140  136 - 145 mEq/L Final    POC Potassium 02/24/2023 3.3 (A)  3.5 - 5.1 mEq/L Final    POC Chloride 02/24/2023 105  99 - 110 mEq/L Final    POC Glucose 02/24/2023 148 (A)  70 - 99 mg/dl Final    POC Creatinine 02/24/2023 1.0  0.8 - 1.3 mg/dL Final    Est, Glom Filt Rate 02/24/2023 >60  >60 Final    Comment: Pediatric calculator link  [email or web address] [email or web address]/gfr_calculatorped  Effective Oct 3, 2022  These results are not intended for use in patients  <25years of age. eGFR results are calculated without  a race factor using the 2021 CKD-EPI equation. Careful  clinical correlation is recommended, particularly when  comparing to results calculated using previous equations.   The CKD-EPI equation is less accurate in patients with  extremes of muscle mass, extra-renal metabolism of  creatinine, excessive creatinine ingestion, or following  therapy that affects renal tubular secretion.       Calcium, Ionized 02/24/2023 1.21  1.12 - 1.32 mmol/L Final    pH, Arterial 02/24/2023 7.447  7.350 - 7.450 Final    pCO2, Arterial 02/24/2023 38  35 - 45 mm Hg Final    pO2, Arterial 02/24/2023 80 (A)  75 - 108 mm Hg Final    HCO3, Arterial 02/24/2023 26.2  21.0 - 29.0 mmol/L Final    Base Excess, Arterial 02/24/2023 2  -3 - 3 Final    O2 Sat, Arterial 02/24/2023 96 (A)  93 - 100 % Final    TCO2, Arterial 02/24/2023 27  21 - 32 mmol/L Final    Lactate 02/24/2023 1.04  0.40 - 2.00 mmol/L Final    POC Hematocrit 02/24/2023 43  41 - 53 % Final    Hemoglobin 02/24/2023 14.6  13.5 - 17.5 gm/dL Final    FIO2 02/24/2023 4.000   Final    Sample Type 02/24/2023 ART   Final    Performed on 02/24/2023 SEE BELOW   Final    Comment: Performed on POC  Sample Type: Arterial  Draw site: R Radial  Jake's test: Positive  Oxygen Delivery System: Cannula      WBC 02/25/2023 21.0 (A)  4.8 - 10.8 K/uL Final    RBC 02/25/2023 4.51 (A)  4.70 - 6.10 M/uL Final    Hemoglobin 02/25/2023 13.3 (A)  14.0 - 18.0 g/dL Final    Hematocrit 02/25/2023 39.9 (A)  42.0 - 52.0 % Final    MCV 02/25/2023 88.6  79.0 - 92.2 fL Final    MCH 02/25/2023 29.5  27.0 - 31.3 pg Final    MCHC 02/25/2023 33.3  33.0 - 37.0 % Final    RDW 02/25/2023 14.9 (A)  11.5 - 14.5 % Final    Platelets 56/59/9771 314  130 - 400 K/uL Final    Neutrophils % 02/25/2023 87.6  % Final    Lymphocytes % 02/25/2023 5.8  % Final    Monocytes % 02/25/2023 6.0  % Final    Eosinophils % 02/25/2023 0.1  % Final    Basophils % 02/25/2023 0.5  % Final    Neutrophils Absolute 02/25/2023 18.4 (A)  1.4 - 6.5 K/uL Final    Lymphocytes Absolute 02/25/2023 1.2  1.0 - 4.8 K/uL Final    Monocytes Absolute 02/25/2023 1.3 (A)  0.2 - 0.8 K/uL Final    Eosinophils Absolute 02/25/2023 0.0  0.0 - 0.7 K/uL Final    Basophils Absolute 02/25/2023 0.1  0.0 - 0.2 K/uL Final    Sodium 02/25/2023 143  135 - 144 mEq/L Final    Potassium 02/25/2023 3.7 3.4 - 4.9 mEq/L Final    Chloride 02/25/2023 108 (A)  95 - 107 mEq/L Final    CO2 02/25/2023 24  20 - 31 mEq/L Final    Anion Gap 02/25/2023 11  9 - 15 mEq/L Final    Glucose 02/25/2023 138 (A)  70 - 99 mg/dL Final    BUN 02/25/2023 16  8 - 23 mg/dL Final    Creatinine 02/25/2023 0.87  0.70 - 1.20 mg/dL Final    Est, Glom Filt Rate 02/25/2023 >60.0  >60 Final    Comment: Pediatric calculator link  [email or web address] [email or web address]/gfr_calculatorped  Effective Oct 3, 2022  These results are not intended for use in patients  <25years of age. eGFR results are calculated without  a race factor using the 2021 CKD-EPI equation. Careful  clinical correlation is recommended, particularly when  comparing to results calculated using previous equations. The CKD-EPI equation is less accurate in patients with  extremes of muscle mass, extra-renal metabolism of  creatinine, excessive creatinine ingestion, or following  therapy that affects renal tubular secretion. Calcium 02/25/2023 8.8  8.5 - 9.9 mg/dL Final    Total Protein 02/25/2023 6.0 (A)  6.3 - 8.0 g/dL Final    Albumin 02/25/2023 2.9 (A)  3.5 - 4.6 g/dL Final    Total Bilirubin 02/25/2023 0.6  0.2 - 0.7 mg/dL Final    Alkaline Phosphatase 02/25/2023 126 (A)  35 - 104 U/L Final    ALT 02/25/2023 13  0 - 41 U/L Final    AST 02/25/2023 19  0 - 40 U/L Final    Globulin 02/25/2023 3.1  2.3 - 3.5 g/dL Final    Procalcitonin 02/24/2023 0.23 (A)  0.00 - 0.15 ng/mL Final    Comment: Suspected Sepsis:  Low likelihood of sepsis  <.50 ng/mL    Increased likelihood of sepsis 0.50-2.00 ng/mL  Antibiotics encouraged    High risk of sepsis/shock   >2.00 ng/mL  Antibiotics strongly encouraged    Suspected Lower Respiratory Tract Infections:  Low likelihood of bacterial infection  <0.24 ng/mL    Increased likelihood of bacterial infection >0.24 ng/mL  Antibiotics encouraged    With successful antibiotic therapy, PCT levels should decrease  rapidly.  (Half-life of 24 to 36 hours.)    Procalcitonin values from samples collected within the first  6 hours of systemic infection may still be low. Retesting may be indicated. Values from day 1 and day 4 can be entered into the Change in  Procalcitonin Calculator to determine the patient's  Mortality Risk Prognosis  ([email or web address]-pct-[email or web address]. com)    In healthy neonates, plasma Procalcitonin (PCT) concentrations  increase gradually after birth, reaching peak values at about  24 hours of age then decrease to normal values below 0.5                            ng/mL  by 48-72 hours of age. POC Glucose 02/24/2023 154 (A)  70 - 99 mg/dl Final    Performed on 02/24/2023 ACCU-CHEK   Final    POC Glucose 02/25/2023 124 (A)  70 - 99 mg/dl Final    Performed on 02/25/2023 ACCU-CHEK   Final    POC Glucose 02/25/2023 133 (A)  70 - 99 mg/dl Final    Performed on 02/25/2023 ACCU-CHEK   Final           IMAGING:   XR CHEST PORTABLE   Final Result   No acute process. CT Head W/O Contrast   Final Result   1. There is no acute intracranial abnormality. Specifically, there is no   intracranial hemorrhage. 2. Atrophy and periventricular leukomalacia,         CTA HEAD W WO CONTRAST   Final Result   Addendum (preliminary) 1 of 1   ADDENDUM:   CTA neck now available which shows extensive atherosclerotic plaque at    level   of carotid bulbs and proximal right and left internal carotid arteries. 50%   stenosis involving proximal right internal carotid artery. Stenosis of   approximately 60% involving proximal left internal carotid artery. No   stenosis involving common carotid arteries. No stenosis or dissection   involving the vertebral arteries. Final   No intracranial arterial stenosis. IMPRESSION:   1. 50% stenosis involving proximal right internal carotid artery. 2. 60% stenosis involving proximal left internal carotid artery.          CTA NECK W WO CONTRAST   Final Result   Addendum (preliminary) 1 of 1   ADDENDUM: CTA neck now available which shows extensive atherosclerotic plaque at    level   of carotid bulbs and proximal right and left internal carotid arteries. 50%   stenosis involving proximal right internal carotid artery. Stenosis of   approximately 60% involving proximal left internal carotid artery. No   stenosis involving common carotid arteries. No stenosis or dissection   involving the vertebral arteries. Final   No intracranial arterial stenosis. IMPRESSION:   1. 50% stenosis involving proximal right internal carotid artery. 2. 60% stenosis involving proximal left internal carotid artery. XR HIP RIGHT (2-3 VIEWS)    (Results Pending)       MENTAL STATUS EXAMINATION  Appearance: alert, sitting in bed, in hospital attire Behavior: limitedly cooperative Mood: stated to be \"fine\" Affect: flat Speech: with normal rate, rhythm Thought Process: concrete associations, perseveration Thought Content: denies suicidal or homicidal ideation; denies paranoia or other delusions Perception: denies hallucinations Orientation: disoriented to time, place, self Concentration: impaired Memory: poor short term memory Abstraction: concrete associations Fund of knowledge: limited Insight: impaired Judgement: impaired      ASSESSMENT:   Probable delirium superimposed on dementia    PLAN & RECOMMENDATIONS: At this time, I would recommend continuing the Depakote sprinkles which may actually help with the resolution of the delirium, as well as the melatonin which can help re-establish the circadian rhythm.         Electronically signed by Birdie Kapoor MD on 2/25/2023 at 3:14 PM

## 2023-02-25 NOTE — CONSULTS
Subjective:      Patient ID: Christy Maya is a 65 y.o. male who presents today for encephalopathy. Kristi Mendes HPI 50-year-old had gentleman now with a known history of vascular dementia. Patient lives in a nursing home and was not using his oxygen. Patient appears to be much better this morning is conversant and is able to talk to me. He is quite happy that I came to see him. He is overall just that this is not known. He was sent from the nursing home when he had altered mental status. Staff reported that he was fine till about 30 minutes prior to his arrival that he was less responsive when he would not open his eyes. When I saw him this morning he was quite well oriented to himself he did not know he was in the hospital but followed commands and appeared to be quite pleasant. Review of Systems   Unable to perform ROS: Mental status change   Was able to give me some answers and he denies any headaches or pain  Past Medical History:   Diagnosis Date    Diabetes mellitus ([facility] [address]     Falls     Gout     Headache     Hyperlipidemia     [facility]     Syncope and collapse      History reviewed. No pertinent surgical history.   Social History     Socioeconomic History    Marital status:      Spouse name: Not on file    Number of children: Not on file    Years of education: Not on file    Highest education level: Not on file   Occupational History    Not on file   Tobacco Use    Smoking status: Not on file    Smokeless tobacco: Not on file   Substance and Sexual Activity    Alcohol use: Not on file    Drug use: Not on file    Sexual activity: Not on file   Other Topics Concern    Not on file   Social History Narrative    Not on file     Social Determinants of Health     Financial Resource Strain: Not on file   Food Insecurity: Not on file   Transportation Needs: Not on file   Physical Activity: Not on file   Stress: Not on file   Social Connections: Not on file   Intimate Partner Violence: Not on file   Housing Stability: Not on file     History reviewed. No pertinent family history.   No Known Allergies  Current Facility-Administered Medications   Medication Dose Route Frequency Provider Last Rate Last Admin    apixaban (ELIQUIS) tablet 2.5 mg  2.5 mg Oral BID Zaida Padgett MD        melatonin tablet 3 mg  3 mg Oral Nightly Zaida Padgett MD        tamsulosin (FLOMAX) capsule 0.4 mg  0.4 mg Oral Nightly Zaida Padgett MD        aspirin chewable tablet 81 mg  81 mg Oral Daily Zaida Padgett MD   81 mg at 02/25/23 1017    allopurinol (ZYLOPRIM) tablet 300 mg  300 mg Oral Daily Zaida Padgett MD   300 mg at 02/25/23 1016    divalproex (DEPAKOTE SPRINKLE) DR capsule 250 mg  250 mg Oral BID Zaida Padgett MD   250 mg at 02/25/23 1016    haloperidol lactate (HALDOL) injection 5 mg  5 mg IntraMUSCular Q6H PRN Zaida Padgett MD        sodium chloride flush 0.9 % injection 5-40 mL  5-40 mL IntraVENous 2 times per day Zaida Padgett MD        sodium chloride flush 0.9 % injection 5-40 mL  5-40 mL IntraVENous PRN Zaida Padgett MD        0.9 % sodium chloride infusion   IntraVENous PRN Zaida Padgett MD        ondansetron (ZOFRAN-ODT) disintegrating tablet 4 mg  4 mg Oral Q8H PRN Zaida Padgett MD        Or    ondansetron (ZOFRAN) injection 4 mg  4 mg IntraVENous Q6H PRN Zaida Padgett MD        polyethylene glycol (GLYCOLAX) packet 17 g  17 g Oral Daily PRN Zaida Padgett MD        acetaminophen (TYLENOL) tablet 650 mg  650 mg Oral Q6H PRN Zaida Padgett MD        Or    acetaminophen (TYLENOL) suppository 650 mg  650 mg Rectal Q6H PRN Zaida Padgett MD        piperacillin-tazobactam (ZOSYN) 3,375 mg in sodium chloride 0.9 % 50 mL IVPB (mini-bag)  3,375 mg IntraVENous Q8H Zaida Padgett MD 12.5 mL/hr at 02/25/23 1012 3,375 mg at 02/25/23 1012    vancomycin (VANCOCIN) 750 mg in sodium chloride 0.9 % 250 mL IVPB  750 mg IntraVENous Q12H Zaida Padgett MD   Stopped at 02/25/23 1023    lactated ringers IV soln infusion IntraVENous Continuous Matilde Ghosh  mL/hr at 02/25/23 1010 New Bag at 02/25/23 1010    insulin lispro (HUMALOG) injection vial 0-4 Units  0-4 Units SubCUTAneous TID WC Matilde Ghosh MD        insulin lispro (HUMALOG) injection vial 0-4 Units  0-4 Units SubCUTAneous Nightly Matilde Ghosh MD        glucose chewable tablet 16 g  4 tablet Oral PRN Matilde Ghosh MD        dextrose bolus 10% 125 mL  125 mL IntraVENous PRN Matilde Ghosh MD        Or    dextrose bolus 10% 250 mL  250 mL IntraVENous PRN Matilde Ghosh MD        glucagon (rDNA) injection 1 mg  1 mg SubCUTAneous PRN Matilde Ghosh MD        dextrose 10 % infusion   IntraVENous Continuous PRN Matilde Ghosh MD        vancomycin (VANCOCIN) intermittent dosing (placeholder)   Other RX Placeholder Matilde Ghosh MD            Objective:   BP (!) 156/74   Pulse 99   Temp 97.9 °F (36.6 °C) (Oral)   Resp 20   Ht 6' (1.829 m)   Wt 178 lb (80.7 kg)   SpO2 100%   BMI 24.14 kg/m²     Physical Exam  Vitals reviewed. Eyes:      Pupils: Pupils are equal, round, and reactive to light. Cardiovascular:      Rate and Rhythm: Normal rate and regular rhythm. Heart sounds: No murmur heard. Pulmonary:      Effort: Pulmonary effort is normal.      Breath sounds: Normal breath sounds. Abdominal:      General: Bowel sounds are normal.   Musculoskeletal:         General: Normal range of motion. Cervical back: Normal range of motion. Skin:     General: Skin is warm. Neurological:      Mental Status: He is alert. He is disoriented. Cranial Nerves: No cranial nerve deficit. Sensory: No sensory deficit. Motor: No abnormal muscle tone. Coordination: Coordination normal.      Deep Tendon Reflexes: Reflexes are normal and symmetric. Babinski sign absent on the right side. Babinski sign absent on the left side.       Comments: Tremors are noted there is no parkinsonian features there is overall strength of 4 5 is areflexic throughout gait is deferred   Psychiatric:         Mood and Affect: Mood normal.       CTA HEAD W WO CONTRAST    Addendum Date: 2/24/2023    ADDENDUM: CTA neck now available which shows extensive atherosclerotic plaque at level of carotid bulbs and proximal right and left internal carotid arteries. 50% stenosis involving proximal right internal carotid artery. Stenosis of approximately 60% involving proximal left internal carotid artery. No stenosis involving common carotid arteries. No stenosis or dissection involving the vertebral arteries. Result Date: 2/24/2023  EXAMINATION: CTA OF THE HEAD WITHOUT AND WITH CONTRAST; CTA OF THE NECK 2/24/2023 2:31 pm TECHNIQUE: CTA of the head/brain was performed without and with the administration of intravenous contrast. Multiplanar reformatted images are provided for review. MIP images are provided for review. Automated exposure control, iterative reconstruction, and/or weight based adjustment of the mA/kV was utilized to reduce the radiation dose to as low as reasonably achievable.; CTA of the neck was performed with the administration of intravenous contrast. Multiplanar reformatted images are provided for review. MIP images are provided for review. Stenosis of the internal carotid arteries measured using NASCET criteria. Automated exposure control, iterative reconstruction, and/or weight based adjustment of the mA/kV was utilized to reduce the radiation dose to as low as reasonably achievable. COMPARISON: None HISTORY: ORDERING SYSTEM PROVIDED HISTORY: altered mental status TECHNOLOGIST PROVIDED HISTORY: Reason for exam:->altered mental status Decision Support Exception - unselect if not a suspected or confirmed emergency medical condition->Emergency Medical Condition (MA) What reading provider will be dictating this exam?->CRC FINDINGS: CTA HEAD: ANTERIOR CIRCULATION: No significant stenosis of the intracranial internal carotid, anterior cerebral, or middle cerebral arteries.  No aneurysm. POSTERIOR CIRCULATION: No significant stenosis of the vertebral, basilar, or posterior cerebral arteries. No aneurysm. OTHER: No dural venous sinus thrombosis on this non-dedicated study.     No intracranial arterial stenosis. IMPRESSION: 1. 50% stenosis involving proximal right internal carotid artery. 2. 60% stenosis involving proximal left internal carotid artery.     CT Head W/O Contrast    Result Date: 2/24/2023  EXAMINATION: CT OF THE HEAD WITHOUT CONTRAST  2/24/2023 2:31 pm TECHNIQUE: CT of the head was performed without the administration of intravenous contrast. Automated exposure control, iterative reconstruction, and/or weight based adjustment of the mA/kV was utilized to reduce the radiation dose to as low as reasonably achievable. COMPARISON: None. HISTORY: ORDERING SYSTEM PROVIDED HISTORY: altered mental status, fall yesterday TECHNOLOGIST PROVIDED HISTORY: Reason for exam:->altered mental status, fall yesterday Has a \"code stroke\" or \"stroke alert\" been called?->No Decision Support Exception - unselect if not a suspected or confirmed emergency medical condition->Emergency Medical Condition (MA) What reading provider will be dictating this exam?->CRC FINDINGS: BRAIN/VENTRICLES: There is no acute intracranial hemorrhage, mass effect or midline shift.  No abnormal extra-axial fluid collection.  The gray-white differentiation is maintained without evidence of an acute infarct.  There is no evidence of hydrocephalus. The ventricles, cisterns and sulci are prominent consistent with atrophy.  There is decreased attenuation within the periventricular white matter consistent with periventricular leukomalacia. ORBITS: The visualized portion of the orbits demonstrate no acute abnormality. SINUSES: The visualized paranasal sinuses and mastoid air cells demonstrate no acute abnormality. SOFT TISSUES/SKULL:  No acute abnormality of the visualized skull or soft tissues.     1. There is no acute  intracranial abnormality. Specifically, there is no intracranial hemorrhage. 2. Atrophy and periventricular leukomalacia,     CTA NECK W WO CONTRAST    Addendum Date: 2/24/2023    ADDENDUM: CTA neck now available which shows extensive atherosclerotic plaque at level of carotid bulbs and proximal right and left internal carotid arteries. 50% stenosis involving proximal right internal carotid artery. Stenosis of approximately 60% involving proximal left internal carotid artery. No stenosis involving common carotid arteries. No stenosis or dissection involving the vertebral arteries. Result Date: 2/24/2023  EXAMINATION: CTA OF THE HEAD WITHOUT AND WITH CONTRAST; CTA OF THE NECK 2/24/2023 2:31 pm TECHNIQUE: CTA of the head/brain was performed without and with the administration of intravenous contrast. Multiplanar reformatted images are provided for review. MIP images are provided for review. Automated exposure control, iterative reconstruction, and/or weight based adjustment of the mA/kV was utilized to reduce the radiation dose to as low as reasonably achievable.; CTA of the neck was performed with the administration of intravenous contrast. Multiplanar reformatted images are provided for review. MIP images are provided for review. Stenosis of the internal carotid arteries measured using NASCET criteria. Automated exposure control, iterative reconstruction, and/or weight based adjustment of the mA/kV was utilized to reduce the radiation dose to as low as reasonably achievable.  COMPARISON: None HISTORY: ORDERING SYSTEM PROVIDED HISTORY: altered mental status TECHNOLOGIST PROVIDED HISTORY: Reason for exam:->altered mental status Decision Support Exception - unselect if not a suspected or confirmed emergency medical condition->Emergency Medical Condition (MA) What reading provider will be dictating this exam?->CRC FINDINGS: CTA HEAD: ANTERIOR CIRCULATION: No significant stenosis of the intracranial internal carotid, anterior cerebral, or middle cerebral arteries. No aneurysm. POSTERIOR CIRCULATION: No significant stenosis of the vertebral, basilar, or posterior cerebral arteries. No aneurysm. OTHER: No dural venous sinus thrombosis on this non-dedicated study. No intracranial arterial stenosis. IMPRESSION: 1. 50% stenosis involving proximal right internal carotid artery. 2. 60% stenosis involving proximal left internal carotid artery. XR CHEST PORTABLE    Result Date: 2/24/2023  EXAMINATION: ONE XRAY VIEW OF THE CHEST 2/24/2023 5:00 pm COMPARISON: None. HISTORY: ORDERING SYSTEM PROVIDED HISTORY: altered mental status TECHNOLOGIST PROVIDED HISTORY: Reason for exam:->altered mental status What reading provider will be dictating this exam?->CRC FINDINGS: The lungs are without acute focal process. Probable right lung atelectasis. There is no effusion or pneumothorax. The cardiomediastinal silhouette is without acute process. The osseous structures are without acute process. No acute process.        Lab Results   Component Value Date/Time    WBC 21.0 02/25/2023 06:23 AM    RBC 4.51 02/25/2023 06:23 AM    HGB 13.3 02/25/2023 06:23 AM    HCT 39.9 02/25/2023 06:23 AM    MCV 88.6 02/25/2023 06:23 AM    MCH 29.5 02/25/2023 06:23 AM    MCHC 33.3 02/25/2023 06:23 AM    RDW 14.9 02/25/2023 06:23 AM     02/25/2023 06:23 AM     Lab Results   Component Value Date/Time     02/25/2023 06:23 AM    K 3.7 02/25/2023 06:23 AM     02/25/2023 06:23 AM    CO2 24 02/25/2023 06:23 AM    BUN 16 02/25/2023 06:23 AM    CREATININE 0.87 02/25/2023 06:23 AM    LABGLOM >60.0 02/25/2023 06:23 AM    GLUCOSE 138 02/25/2023 06:23 AM    PROT 6.0 02/25/2023 06:23 AM    LABALBU 2.9 02/25/2023 06:23 AM    CALCIUM 8.8 02/25/2023 06:23 AM    BILITOT 0.6 02/25/2023 06:23 AM    ALKPHOS 126 02/25/2023 06:23 AM    AST 19 02/25/2023 06:23 AM    ALT 13 02/25/2023 06:23 AM     Lab Results   Component Value Date/Time    PROTIME 13.6 02/24/2023 01:15 PM    INR 1.0 02/24/2023 01:15 PM     No results found for: TSH, [id number], FOLATE, FERRITIN, IRON, TIBC, PTRFSAT, RETICCOUNT, TSH, FREET4  No results found for: TRIG, HDL, LDLCALC, LDLDIRECT, LABVLDL  Lab Results   Component Value Date/Time    ETOH <10 02/24/2023 01:15 PM     Lab Results   Component Value Date/Time    VALPROATE 29.5 02/24/2023 05:05 PM       Assessment:   Vascular dementia with a fluctuating course. Patient appears to be much better today there is no session of any other focal findings or suggestion of seizures though he is at somewhat risk for seizures. We will not empirically treat him though. He appears to be his baseline is details of his dementia are not well-known to me and trying to find his family for the same. We will try and contact them and see what else has occurred. CT scan of the head and CT angiograms and cervical spine CTs were reviewed and case was discussed with the      Felix Dixon Aas, MD, Nehemiah De Souza, American Board of Psychiatry & Neurology  Board Certified in Vascular Neurology  Board Certified in Neuromuscular Medicine  Certified in [facility]:

## 2023-02-25 NOTE — FLOWSHEET NOTE
Pt is lethargic upon arrival to floor, v/s stable on 4L nc. Able to state name on verbal command. Admission assessment started home meds updated base from his medical record from the nursing home. Unable to complete admission questionnaires due to pts condition. 1- Dr Evelin Arteaga at bedside assessing pt, v/s stable still lethargic, able open eyes but drifted back to sleep. Trying to contact [facility] to find out his baseline, no response to try again. [id number]- spoke to the nurse in [facility], she mentioned that pt usually is A&O x2 and usually walking around.

## 2023-02-25 NOTE — PLAN OF CARE

## 2023-02-25 NOTE — PROGRESS NOTES
Patient resting in bed. No complaints of pain or discomfort. No further needs. Call light in reach. 1300 Sebas served Dr. Babar Cordon about new complaints of right hip pain. New orders for xray received. 65 Sebas served Dr. Babar Cordon that patient is refusing to wear tele monitor.

## 2023-02-25 NOTE — PROGRESS NOTES
[facility]   Pharmacy Pharmacokinetic Monitoring Service - Vancomycin     Phill Oseguera is a 77 y.o. male starting on vancomycin therapy for sepsis of unknown etiology. Pharmacy consulted by Dr Sterling for monitoring and adjustment.    Target Concentration: Goal AUC/MARIS 400-600 mg*hr/L    Additional Antimicrobials: Zosyn    Pertinent Laboratory Values:   Wt Readings from Last 1 Encounters:   02/24/23 183 lb (83 kg)     Temp Readings from Last 1 Encounters:   02/24/23 99.1 °F (37.3 °C)     Estimated Creatinine Clearance: 67 mL/min (based on SCr of 1 mg/dL).  Recent Labs     02/24/23  1315 02/24/23  1627   CREATININE 1.02 1.0   WBC 23.8*  --      Procalcitonin:   Recent Labs     02/24/23  1705   PROCAL 0.19*         Pertinent Cultures:  Culture Date Source Results   2/24/23 blood pending   MRSA Nasal Swab: N/A. Non-respiratory infection.    Plan:  Dosing recommendations based on Bayesian software  Start vancomycin 1000mg IV x 1 given in ER, followed by 750mg IV q12hr  Anticipated AUC of 473 and trough concentration of 16.1 at steady state  Renal labs as indicated   Vancomycin concentration ordered for 2/26/23 @ 0600   Pharmacy will continue to monitor patient and adjust therapy as indicated    Thank you for the consult,  Jeremie Major RPH  2/24/2023 9:28 PM

## 2023-02-25 NOTE — PROGRESS NOTES
Hospitalist Progress Note      PCP: Breanna Moreno MD    Date of Admission: 2/24/2023    Chief Complaint:    Chief Complaint   Patient presents with    Altered Mental Status     Subjective:  Patient has dementia; unable to accurately complete full 12 point ROS    Medications:  Reviewed    Infusion Medications    sodium chloride      lactated ringers IV soln 100 mL/hr at 02/25/23 1010    dextrose       Scheduled Medications    apixaban  2.5 mg Oral BID    melatonin  3 mg Oral Nightly    tamsulosin  0.4 mg Oral Nightly    aspirin  81 mg Oral Daily    allopurinol  300 mg Oral Daily    divalproex  250 mg Oral BID    sodium chloride flush  5-40 mL IntraVENous 2 times per day    piperacillin-tazobactam  3,375 mg IntraVENous Q8H    vancomycin  750 mg IntraVENous Q12H    insulin lispro  0-4 Units SubCUTAneous TID WC    insulin lispro  0-4 Units SubCUTAneous Nightly    vancomycin (VANCOCIN) intermittent dosing (placeholder)   Other RX Placeholder     PRN Meds: haloperidol lactate, sodium chloride flush, sodium chloride, ondansetron **OR** ondansetron, polyethylene glycol, acetaminophen **OR** acetaminophen, glucose, dextrose bolus **OR** dextrose bolus, glucagon (rDNA), dextrose      Intake/Output Summary (Last 24 hours) at 2/25/2023 1310  Last data filed at 2/24/2023 1515  Gross per 24 hour   Intake --   Output 500 ml   Net -500 ml       Exam:    BP (!) 156/74   Pulse 99   Temp 97.9 °F (36.6 °C) (Oral)   Resp 20   Ht 6' (1.829 m)   Wt 178 lb (80.7 kg)   SpO2 100%   BMI 24.14 kg/m²     General appearance: No apparent distress, appears stated age and cooperative. HEENT:  Conjunctivae/corneas clear. Neck: Trachea midline. Respiratory:  Normal respiratory effort. Clear to auscultation  Cardiovascular: Regular rate and rhythm  Abdomen: Soft, non-tender, non-distended with normal bowel sounds.   Musculoskeletal: No clubbing, cyanosis or edema bilaterally  Neuro: Non Focal.   Capillary Refill: Brisk,< 3 seconds Peripheral Pulses: +2 palpable, equal bilaterally       Labs:   Recent Labs     02/24/23  1315 02/24/23  1627 02/25/23  0623   WBC 23.8*  --  21.0*   HGB 14.5 14.6 13.3*   HCT 44.1  --  39.9*     --  314     Recent Labs     02/24/23  1315 02/24/23  1627 02/25/23  0623     --  143   K 3.9  --  3.7   CL 98  --  108*   CO2 27  --  24   BUN 18  --  16   CREATININE 1.02 1.0 0.87   CALCIUM 9.3  --  8.8     Recent Labs     02/24/23  1315 02/25/23  0623   AST 28 19   ALT 16 13   BILITOT 0.5 0.6   ALKPHOS 146* 126*     Recent Labs     02/24/23  1315   INR 1.0     Recent Labs     02/24/23  1315 02/24/23  1705   TROPONINI 0.017* 0.014*       Urinalysis:      Lab Results   Component Value Date/Time    NITRU Negative 02/24/2023 01:15 PM    BLOODU Negative 02/24/2023 01:15 PM    SPECGRAV 1.020 02/24/2023 01:15 PM    GLUCOSEU Negative 02/24/2023 01:15 PM       Radiology:  XR CHEST PORTABLE   Final Result   No acute process. CT Head W/O Contrast   Final Result   1. There is no acute intracranial abnormality. Specifically, there is no   intracranial hemorrhage. 2. Atrophy and periventricular leukomalacia,         CTA HEAD W WO CONTRAST   Final Result   Addendum (preliminary) 1 of 1   ADDENDUM:   CTA neck now available which shows extensive atherosclerotic plaque at    level   of carotid bulbs and proximal right and left internal carotid arteries. 50%   stenosis involving proximal right internal carotid artery. Stenosis of   approximately 60% involving proximal left internal carotid artery. No   stenosis involving common carotid arteries. No stenosis or dissection   involving the vertebral arteries. Final   No intracranial arterial stenosis. IMPRESSION:   1. 50% stenosis involving proximal right internal carotid artery. 2. 60% stenosis involving proximal left internal carotid artery.          CTA NECK W WO CONTRAST   Final Result   Addendum (preliminary) 1 of 1   ADDENDUM:   CTA neck now available which shows extensive atherosclerotic plaque at    level   of carotid bulbs and proximal right and left internal carotid arteries. 50%   stenosis involving proximal right internal carotid artery. Stenosis of   approximately 60% involving proximal left internal carotid artery. No   stenosis involving common carotid arteries. No stenosis or dissection   involving the vertebral arteries. Final   No intracranial arterial stenosis. IMPRESSION:   1. 50% stenosis involving proximal right internal carotid artery. 2. 60% stenosis involving proximal left internal carotid artery. Assessment/Plan:    #Dementia with intermittent encephalopathy    - possibly sundowning    - neuro consulted for their opinion    #Leucocytosis    - no clear source of infection; will consult ID for their opinion; continue broad spec abx      Active Hospital Problems    Diagnosis Date Noted    AMS (altered mental status) [R41.82] 02/24/2023     Priority: Medium        Additional work up or/and treatment plan may be added today or then after based on clinical progression. I am managing a portion of pt care. Some medical issues are handled by other specialists. Additional work up and treatment should be done in out pt setting by pt PCP and other out pt providers. In addition to examining and evaluating pt, I spent additional time explaining care, normal and abnormal findings, and treatment plan. All of pt questions were answered. Counseling, diet and education were  provided. Case will be discussed with nursing staff when appropriate. Family will be updated if and when appropriate. Diet: ADULT DIET;  Regular; 4 carb choices (60 gm/meal)    Code Status: Full Code    PT/OT Eval     Electronically signed by Louie Du MD on 2/25/2023 at 1:10 PM

## 2023-02-25 NOTE — PROGRESS NOTES
[facility] [facility] Pharmacokinetic Monitoring Service - Vancomycin    Consulting Provider: Dr. Yasmin Huffman   Indication: Sepsis  Target Concentration: Goal AUC/MARIS 400-600 mg*hr/L  Day of Therapy: 2  Additional Antimicrobials: Zosyn    Pertinent Laboratory Values: Wt Readings from Last 1 Encounters:   02/25/23 178 lb (80.7 kg)     Temp Readings from Last 1 Encounters:   02/25/23 97.9 °F (36.6 °C) (Oral)     Estimated Creatinine Clearance: 77 mL/min (based on SCr of 0.87 mg/dL). Recent Labs     02/24/23  1315 02/24/23  1627 02/25/23  0623   CREATININE 1.02 1.0 0.87   WBC 23.8*  --  21.0*     Procalcitonin   Date Value Ref Range Status   02/24/2023 0.23 (H) 0.00 - 0.15 ng/mL Final     Comment:     Suspected Sepsis:  Low likelihood of sepsis  <.50 ng/mL    Increased likelihood of sepsis 0.50-2.00 ng/mL  Antibiotics encouraged    High risk of sepsis/shock   >2.00 ng/mL  Antibiotics strongly encouraged    Suspected Lower Respiratory Tract Infections:  Low likelihood of bacterial infection  <0.24 ng/mL    Increased likelihood of bacterial infection >0.24 ng/mL  Antibiotics encouraged    With successful antibiotic therapy, PCT levels should decrease  rapidly. (Half-life of 24 to 36 hours.)    Procalcitonin values from samples collected within the first  6 hours of systemic infection may still be low. Retesting may be indicated. Values from day 1 and day 4 can be entered into the Change in  Procalcitonin Calculator to determine the patient's  Mortality Risk Prognosis  ([email or web address]-[email or web address] com)    In healthy neonates, plasma Procalcitonin (PCT) concentrations  increase gradually after birth, reaching peak values at about  24 hours of age then decrease to normal values below 0.5 ng/mL  by 48-72 hours of age. Pertinent Cultures:  Culture Date Source Results   2/24 Blood Pending   MRSA Nasal Swab: N/A. Non-respiratory infection.     Recent vancomycin administrations                     vancomycin (VANCOCIN) 750 mg in sodium chloride 0.9 % 250 mL IVPB (mg) 750 mg New Bag 02/25/23 0856    vancomycin 1000 mg IVPB in 250 mL NS addavial (mg) 1,000 mg New Bag 02/24/23 1821                    Assessment:  Date/Time Current Dose Concentration Timing of Concentration (h) AUC   2/25 750 mg Q12H - - 441 mg/L.hr   Note: Serum concentrations collected for AUC dosing may appear elevated if collected in close proximity to the dose administered, this is not necessarily an indication of toxicity    Plan:  Current dosing regimen is therapeutic  Continue current dose  Repeat vancomycin concentration ordered for 02/26 @ 0600   Pharmacy will continue to monitor patient and adjust therapy as indicated    Thank you for the consult,    Jadyn Plata, PharmD  PGY1 Pharmacy Resident  2/25/2023 12:39 PM

## 2023-02-25 NOTE — PROGRESS NOTES
Spiritual Care Services     Summary of Visit:  Pt awake and comfortable today, easily in engaged. Pt open to prayer and communion and recalled and participated in this ritual. Pt's family present, no concerns at this time. Encounter Summary  Encounter Overview/Reason : Initial Encounter, Rituals, Rites and Sacraments  Service Provided For[de-identified] Patient and family together  Referral/Consult From[de-identified] Rounding  Support System: Spouse, Children, Family members  Complexity of Encounter: Low  Begin Time: 1300  End Time : 0223  Total Time Calculated: 15 min  Encounter   Type: Initial Screen/Assessment     Spiritual/Emotional needs  Type: Spiritual Support  Rituals, Rites and Sacraments  Type: Sikhism Communion      Spiritual Assessment/Intervention/Outcomes:    Assessment: Calm    Intervention: Prayer (assurance of)/[place], Read/Provided Scripture    Outcome: Receptive      Care Plan:    Plan and Referrals  Plan/Referrals: Continue Support (comment)    [id number] [address]ilar   Electronically signed by Dania Gonzalez [facility] on 2/25/2023 at 3:41 PM.    To reach a  for emotional and spiritual support, place an [facility] consult request.   If a  is needed immediately, dial 0 and ask to page the on-call .

## 2023-02-25 NOTE — CARE COORDINATION
Case Management Assessment  Initial Evaluation    Date/Time of Evaluation: 2/24/2023 7:21 PM  Assessment Completed by: Radha Jiang RN    If patient is discharged prior to next notation, then this note serves as note for discharge by case management. Patient Name: Jeferson Souza                   YOB: 1945  Diagnosis: AMS (altered mental status) [R41.82]                   Date / Time: 2/24/2023 12:57 PM    Patient Admission Status: Inpatient   Readmission Risk (Low < 19, Mod (19-27), High > 27): No data recorded  Current PCP: Viola Shin MD  PCP verified by CM? No    Chart Reviewed: Yes      History Provided by: Significant Other  Patient Orientation: Unable to Assess    Patient Cognition: Severely Impaired    Hospitalization in the last 30 days (Readmission):  No        Advance Directives:      Code Status: Not on file   Patient's Primary Decision Maker is: Legal Next of Kin      Discharge Planning:    Patient lives with: Other (Comment) Type of Home: Skilled Nursing Facility  Primary Care Giver: Spouse  Patient Support Systems include: Spouse/Significant Other     Current services prior to admission: Sara Brown (Comment) ([facility] X 2 WEEKS)            Current DME:  NONE            Type of Home Care services:   N/A    ADLS  Prior functional level: Assistance with the following:, Bathing, Dressing, Toileting  Current functional level: Other (see comment) (UNABLE TO ASSESS)        Family can provide assistance at DC: No  Would you like Case Management to discuss the discharge plan with any other family members/significant others, and if so, who?     Plans to Return to Present Housing: Unknown at present  Other Identified Issues/Barriers to RETURNING to current housing: IF [address] [facility]  Potential Assistance needed at discharge: Osvaldo Henson              Patient expects to discharge to: Skilled nursing facility (RETURN TO [facility])      Financial    Payor: Jill November / Plan: Francy Marino ESSENTIAL/PLUS / Product Type: *No Product type* /     Does insurance require precert for SNF: Yes    Potential assistance Purchasing Medications:  NO    No Pharmacies Listed              Notes:    Factors facilitating achievement of predicted outcomes: UNABLE TO ASSESS    Barriers to discharge: Confusion    Additional Case Management Notes: AWAIT PSYCH INPUT RETURN [facility] IF [address]               The Plan for Transition of Care is related to the following treatment goals of AMS (altered mental status) [L60.40]    IF APPLICABLE: The Patient and/or patient representative Alberto Valentino and his family were provided with a choice of provider and agrees with the discharge plan. Freedom of choice list with basic dialogue that supports the patient's individualized plan of care/goals and shares the quality data associated with the providers was provided to:     Patient Representative Name:   [place] Acharya    The Patient and/or Patient Representative Agree with the Discharge Plan?  Yes (WIFE WANTS RETURNED BACK TO [facility])    Estefanía Doyle RN  Case Management Department

## 2023-02-26 VITALS
HEART RATE: 99 BPM | TEMPERATURE: 97.3 F | HEIGHT: 72 IN | DIASTOLIC BLOOD PRESSURE: 67 MMHG | RESPIRATION RATE: 18 BRPM | BODY MASS INDEX: 24.11 KG/M2 | SYSTOLIC BLOOD PRESSURE: 148 MMHG | OXYGEN SATURATION: 95 % | WEIGHT: 178 LBS

## 2023-02-26 LAB
ALBUMIN SERPL-MCNC: 2.8 G/DL (ref 3.5–4.6)
ALP BLD-CCNC: 119 U/L (ref 35–104)
ALT SERPL-CCNC: 11 U/L (ref 0–41)
ANION GAP SERPL CALCULATED.3IONS-SCNC: 11 MEQ/L (ref 9–15)
AST SERPL-CCNC: 18 U/L (ref 0–40)
BASOPHILS ABSOLUTE: 0.1 K/UL (ref 0–0.2)
BASOPHILS RELATIVE PERCENT: 0.7 %
BILIRUB SERPL-MCNC: 0.5 MG/DL (ref 0.2–0.7)
BUN BLDV-MCNC: 10 MG/DL (ref 8–23)
CALCIUM SERPL-MCNC: 8.7 MG/DL (ref 8.5–9.9)
CHLORIDE BLD-SCNC: 102 MEQ/L (ref 95–107)
CO2: 28 MEQ/L (ref 20–31)
CREAT SERPL-MCNC: 0.7 MG/DL (ref 0.7–1.2)
EOSINOPHILS ABSOLUTE: 0.1 K/UL (ref 0–0.7)
EOSINOPHILS RELATIVE PERCENT: 1 %
GFR SERPL CREATININE-BSD FRML MDRD: >60 ML/MIN/{1.73_M2}
GLOBULIN: 2.6 G/DL (ref 2.3–3.5)
GLUCOSE BLD-MCNC: 106 MG/DL (ref 70–99)
GLUCOSE BLD-MCNC: 122 MG/DL (ref 70–99)
GLUCOSE BLD-MCNC: 133 MG/DL (ref 70–99)
GLUCOSE BLD-MCNC: 92 MG/DL (ref 70–99)
HCT VFR BLD CALC: 34.6 % (ref 42–52)
HEMOGLOBIN: 11.6 G/DL (ref 14–18)
LYMPHOCYTES ABSOLUTE: 1.6 K/UL (ref 1–4.8)
LYMPHOCYTES RELATIVE PERCENT: 11.2 %
MCH RBC QN AUTO: 29.5 PG (ref 27–31.3)
MCHC RBC AUTO-ENTMCNC: 33.5 % (ref 33–37)
MCV RBC AUTO: 88 FL (ref 79–92.2)
MONOCYTES ABSOLUTE: 0.9 K/UL (ref 0.2–0.8)
MONOCYTES RELATIVE PERCENT: 6.2 %
NEUTROPHILS ABSOLUTE: 11.6 K/UL (ref 1.4–6.5)
NEUTROPHILS RELATIVE PERCENT: 80.9 %
PDW BLD-RTO: 14.7 % (ref 11.5–14.5)
PERFORMED ON: ABNORMAL
PERFORMED ON: ABNORMAL
PERFORMED ON: NORMAL
PLATELET # BLD: 279 K/UL (ref 130–400)
POTASSIUM SERPL-SCNC: 2.8 MEQ/L (ref 3.4–4.9)
PROCALCITONIN: 0.11 NG/ML (ref 0–0.15)
RBC # BLD: 3.94 M/UL (ref 4.7–6.1)
SODIUM BLD-SCNC: 141 MEQ/L (ref 135–144)
TOTAL PROTEIN: 5.4 G/DL (ref 6.3–8)
WBC # BLD: 14.4 K/UL (ref 4.8–10.8)

## 2023-02-26 PROCEDURE — 80053 COMPREHEN METABOLIC PANEL: CPT

## 2023-02-26 PROCEDURE — 84145 PROCALCITONIN (PCT): CPT

## 2023-02-26 PROCEDURE — 6360000002 HC RX W HCPCS: Performed by: FAMILY MEDICINE

## 2023-02-26 PROCEDURE — 2580000003 HC RX 258: Performed by: INTERNAL MEDICINE

## 2023-02-26 PROCEDURE — 36415 COLL VENOUS BLD VENIPUNCTURE: CPT

## 2023-02-26 PROCEDURE — 6360000002 HC RX W HCPCS: Performed by: INTERNAL MEDICINE

## 2023-02-26 PROCEDURE — 85025 COMPLETE CBC W/AUTO DIFF WBC: CPT

## 2023-02-26 PROCEDURE — 99232 SBSQ HOSP IP/OBS MODERATE 35: CPT | Performed by: PSYCHIATRY & NEUROLOGY

## 2023-02-26 PROCEDURE — 6370000000 HC RX 637 (ALT 250 FOR IP): Performed by: INTERNAL MEDICINE

## 2023-02-26 RX ORDER — POTASSIUM CHLORIDE 7.45 MG/ML
10 INJECTION INTRAVENOUS PRN
Status: DISCONTINUED | OUTPATIENT
Start: 2023-02-26 | End: 2023-02-26 | Stop reason: HOSPADM

## 2023-02-26 RX ORDER — LORAZEPAM 2 MG/ML
0.5 INJECTION INTRAMUSCULAR ONCE
Status: DISCONTINUED | OUTPATIENT
Start: 2023-02-26 | End: 2023-02-26 | Stop reason: HOSPADM

## 2023-02-26 RX ORDER — POTASSIUM CHLORIDE 20 MEQ/1
40 TABLET, EXTENDED RELEASE ORAL PRN
Status: DISCONTINUED | OUTPATIENT
Start: 2023-02-26 | End: 2023-02-26 | Stop reason: HOSPADM

## 2023-02-26 RX ADMIN — POTASSIUM CHLORIDE 10 MEQ: 7.46 INJECTION, SOLUTION INTRAVENOUS at 13:34

## 2023-02-26 RX ADMIN — PIPERACILLIN AND TAZOBACTAM 3375 MG: 3; .375 INJECTION, POWDER, FOR SOLUTION INTRAVENOUS at 14:38

## 2023-02-26 RX ADMIN — SODIUM CHLORIDE, POTASSIUM CHLORIDE, SODIUM LACTATE AND CALCIUM CHLORIDE: 600; 310; 30; 20 INJECTION, SOLUTION INTRAVENOUS at 09:12

## 2023-02-26 RX ADMIN — POTASSIUM CHLORIDE 10 MEQ: 7.46 INJECTION, SOLUTION INTRAVENOUS at 10:09

## 2023-02-26 RX ADMIN — ASPIRIN 81 MG 81 MG: 81 TABLET ORAL at 08:11

## 2023-02-26 RX ADMIN — ALLOPURINOL 300 MG: 300 TABLET ORAL at 08:11

## 2023-02-26 RX ADMIN — APIXABAN 2.5 MG: 2.5 TABLET, FILM COATED ORAL at 08:12

## 2023-02-26 RX ADMIN — DIVALPROEX SODIUM 250 MG: 125 CAPSULE, COATED PELLETS ORAL at 08:12

## 2023-02-26 RX ADMIN — POTASSIUM CHLORIDE 10 MEQ: 7.46 INJECTION, SOLUTION INTRAVENOUS at 09:12

## 2023-02-26 RX ADMIN — POTASSIUM CHLORIDE 10 MEQ: 7.46 INJECTION, SOLUTION INTRAVENOUS at 11:15

## 2023-02-26 RX ADMIN — POTASSIUM CHLORIDE 10 MEQ: 7.46 INJECTION, SOLUTION INTRAVENOUS at 08:10

## 2023-02-26 RX ADMIN — POTASSIUM CHLORIDE 10 MEQ: 7.46 INJECTION, SOLUTION INTRAVENOUS at 12:30

## 2023-02-26 RX ADMIN — PIPERACILLIN AND TAZOBACTAM 3375 MG: 3; .375 INJECTION, POWDER, FOR SOLUTION INTRAVENOUS at 02:46

## 2023-02-26 ASSESSMENT — PAIN SCALES - GENERAL: PAINLEVEL_OUTOF10: 0

## 2023-02-26 NOTE — CONSULTS
Deshawn Terrell  1945  male  Medical Record Number: [id number]    Patient informed that I am an Infectious Disease physician and permission obtained from the patient to speak in front of any visitors prior to any discussion for HIPPA purposes. HPI: all information from the MR due to altered mentation  Patient from nursing home with recently diagnosed vascular dementia  CT reviewed    Respiratory panel found to be positive for Human Metapneumovirus which may be contributing to his confusion. Procal noted to be just 0.19  UA and blood cultures are negative. Seen with tech at bedside. Review of Systems: All 14 review of systems were discussed with the patient and are negative other than as stated above      Past Medical History:   Diagnosis Date    Diabetes mellitus ([facility]     Falls     Gout     Headache     Hyperlipidemia     PE (pulmonary thromboembolism) ([facility]     Syncope and collapse        History reviewed. No pertinent surgical history. Social History     Socioeconomic History    Marital status:      Spouse name: Not on file    Number of children: Not on file    Years of education: Not on file    Highest education level: Not on file   Occupational History    Not on file   Tobacco Use    Smoking status: Not on file    Smokeless tobacco: Not on file   Substance and Sexual Activity    Alcohol use: Not on file    Drug use: Not on file    Sexual activity: Not on file   Other Topics Concern    Not on file   Social History Narrative    Not on file     Social Determinants of Health     Financial Resource Strain: Not on file   Food Insecurity: Not on file   Transportation Needs: Not on file   Physical Activity: Not on file   Stress: Not on file   Social Connections: Not on file   Intimate Partner Violence: Not on file   Housing Stability: Not on file       History reviewed. No pertinent family history. No current facility-administered medications on file prior to encounter.      Current Outpatient Medications on File Prior to Encounter   Medication Sig Dispense Refill    acetaminophen (TYLENOL) 325 MG tablet Take 650 mg by mouth every 6 hours as needed      amLODIPine (NORVASC) 5 MG tablet Take 5 mg by mouth daily      apixaban (ELIQUIS) 2.5 MG TABS tablet Take 2.5 mg by mouth 2 times daily      aspirin 81 MG chewable tablet Take 81 mg by mouth daily      atorvastatin (LIPITOR) 40 MG tablet Take 40 mg by mouth nightly      carvedilol (COREG) 6.25 MG tablet Take 6.25 mg by mouth 2 times daily      divalproex (DEPAKOTE SPRINKLE) 125 MG DR capsule Take 250 mg by mouth 2 times daily      haloperidol (HALDOL) 5 MG tablet Take 5 mg by mouth 2 times daily      hydrALAZINE (APRESOLINE) 25 MG tablet Take 25 mg by mouth every 6 hours as needed      melatonin 3 MG TABS tablet Take 3 mg by mouth nightly      tamsulosin (FLOMAX) 0.4 MG capsule Take 0.4 mg by mouth nightly      allopurinol (ZYLOPRIM) 300 MG tablet Take 300 mg by mouth daily      ELIQUIS 2.5 MG TABS tablet Take 2.5 mg by mouth 2 times daily      atorvastatin (LIPITOR) 40 MG tablet Take 40 mg by mouth at bedtime         Physical Exam:  Awake and talkative. He asks me to contact family in the morning. Will try to contact tomorrow   General: Patient appears in no acute distress, cooperative  Skin: no new rashes   Neck is supple  Heart: S1 S2  Lungs: bilaterally clear to auscultation  Abdomen: soft, ND, NTTP, +BS  Extrem: no calf pain, no asymmetry of the upper or lower extremities  Neuro exam: CN II-XII intact  No slurred speech    Labs: I have reviewed all lab results by electronic record, including most recent CBC, metabolic panel, and pertinent abnormalities were addressed from an infectious disease perspective. WBC trends are being monitored.     Lab Results   Component Value Date/Time     02/25/2023 06:23 AM    K 3.7 02/25/2023 06:23 AM     02/25/2023 06:23 AM    CO2 24 02/25/2023 06:23 AM    BUN 16 02/25/2023 06:23 AM CREATININE 0.87 02/25/2023 06:23 AM    GLUCOSE 138 02/25/2023 06:23 AM    CALCIUM 8.8 02/25/2023 06:23 AM      Lab Results   Component Value Date    WBC 21.0 (H) 02/25/2023    HGB 13.3 (L) 02/25/2023    HCT 39.9 (L) 02/25/2023    MCV 88.6 02/25/2023     02/25/2023       Radiology:   I have reviewed imaging results per electronic record and most pertinent abnormalities are being addressed from an infectious disease standpoint. Assessment:  Human Metapneumovirus infection  Acute metabolic encephalopathy in the setting of vascular dementia - much better right now. Plan:  Rosalio Irizarry for today  De-escalate abx tomorrow if no evidence of bacterial infection    This was a requested consult  Above is my noted response to the concern that prompted the consult. Communication directed toward referring primary.      Cony Whitten D.O.

## 2023-02-26 NOTE — PLAN OF CARE
Problem: Discharge Planning  Goal: Discharge to home or other facility with appropriate resources  2/26/2023 0033 by Adrienne Carvajal RN  Outcome: Progressing  2/25/2023 1803 by Nico Stuart RN  Outcome: Progressing     Problem: Chronic Conditions and Co-morbidities  Goal: Patient's chronic conditions and co-morbidity symptoms are monitored and maintained or improved  2/26/2023 0033 by Adrienne Carvajal RN  Outcome: Progressing  2/25/2023 1803 by Nico Stuart RN  Outcome: Progressing     Problem: Pain  Goal: Verbalizes/displays adequate comfort level or baseline comfort level  2/26/2023 0033 by Adrienne Carvajal RN  Outcome: Progressing  2/25/2023 1803 by Nico Stuart RN  Outcome: Progressing     Problem: Safety - Adult  Goal: Free from fall injury  2/26/2023 0033 by Adrienne Carvajal RN  Outcome: Progressing  2/25/2023 1803 by Nico Stuart RN  Outcome: Progressing

## 2023-02-26 NOTE — PROGRESS NOTES
Patient in bed. Assist with setting up breakfast tray. Orders for potassium replacement started. Patient is agitated today. Avast in place. No further needs. Call light in reach. 1730 Three attempts called to [facility] to give report with no success.      [address] here to transport patient to [facility]

## 2023-02-26 NOTE — PROGRESS NOTES
[employer] camera placed in room for safety. Pt picking at IV. Alert oriented but confused at times. Meds given per orders. Pt turned and repositioned. IVF infusing per orders. Antibiotics given. Pt on fall and droplet precautions. Incontinent of bowel and bladder. Hourly rounds continue. Call light in reach.

## 2023-02-26 NOTE — PROGRESS NOTES
Bed at [address] [place] [address] BED: 305  Number for report: [phone]  Accepting DOCTOR: Chrissie Bolivar   scheduled between 6-7 pm

## 2023-02-26 NOTE — CONSULTS
Subjective:      Patient ID: Kavita Burt is a 65 y.o. male who presents today for encephalopathy. Mekhi Postal HPI 19-year-old had gentleman now with a known history of vascular dementia. Patient lives in a nursing home and was not using his oxygen. Patient appears to be much better this morning is conversant and is able to talk to me. He is quite happy that I came to see him. He is overall just that this is not known. He was sent from the nursing home when he had altered mental status. Staff reported that he was fine till about 30 minutes prior to his arrival that he was less responsive when he would not open his eyes. When I saw him this morning he was quite well oriented to himself he did not know he was in the hospital but followed commands and appeared to be quite pleasant. Review of Systems   Unable to perform ROS: Mental status change   Was able to give me some answers and he denies any headaches or pain patient is much more awake and is able to participate in some review of system recognizes family members. He is somewhat lethargic as he has not slept for 2 days  Past Medical History:   Diagnosis Date    Diabetes mellitus ([facility] [address]     Falls     Gout     Headache     Hyperlipidemia     PE (pulmonary thromboembolism) ([facility] [address]     Syncope and collapse      History reviewed. No pertinent surgical history.   Social History     Socioeconomic History    Marital status:      Spouse name: Not on file    Number of children: Not on file    Years of education: Not on file    Highest education level: Not on file   Occupational History    Not on file   Tobacco Use    Smoking status: Not on file    Smokeless tobacco: Not on file   Substance and Sexual Activity    Alcohol use: Not on file    Drug use: Not on file    Sexual activity: Not on file   Other Topics Concern    Not on file   Social History Narrative    Not on file     Social Determinants of Health     Financial Resource Strain: Not on file   Food Insecurity: Not on file   Transportation Needs: Not on file   Physical Activity: Not on file   Stress: Not on file   Social Connections: Not on file   Intimate Partner Violence: Not on file   Housing Stability: Not on file     History reviewed. No pertinent family history.   No Known Allergies  Current Facility-Administered Medications   Medication Dose Route Frequency Provider Last Rate Last Admin    potassium chloride (KLOR-CON M) extended release tablet 40 mEq  40 mEq Oral PRN Moshe Gupta MD        Or    potassium bicarb-citric acid (EFFER-K) effervescent tablet 40 mEq  40 mEq Oral PRN Moshe Gupta MD        Or    potassium chloride 10 mEq/100 mL IVPB (Peripheral Line)  10 mEq IntraVENous PRN Moshe Gupta  mL/hr at 02/26/23 1115 10 mEq at 02/26/23 1115    apixaban (ELIQUIS) tablet 2.5 mg  2.5 mg Oral BID Radha Hua MD   2.5 mg at 02/26/23 1317    melatonin tablet 3 mg  3 mg Oral Nightly Radha Hua MD   3 mg at 02/25/23 2123    tamsulosin (FLOMAX) capsule 0.4 mg  0.4 mg Oral Nightly Radha Hua MD   0.4 mg at 02/25/23 2123    aspirin chewable tablet 81 mg  81 mg Oral Daily Radha Hua MD   81 mg at 02/26/23 7758    allopurinol (ZYLOPRIM) tablet 300 mg  300 mg Oral Daily Radha Hua MD   300 mg at 02/26/23 9709    divalproex (DEPAKOTE SPRINKLE) DR capsule 250 mg  250 mg Oral BID Radha Hua MD   250 mg at 02/26/23 2330    [Held by provider] haloperidol lactate (HALDOL) injection 5 mg  5 mg IntraMUSCular Q6H PRN Radha Hua MD        sodium chloride flush 0.9 % injection 5-40 mL  5-40 mL IntraVENous 2 times per day Radha Hua MD   10 mL at 02/25/23 2124    sodium chloride flush 0.9 % injection 5-40 mL  5-40 mL IntraVENous PRN Radha Hua MD        0.9 % sodium chloride infusion   IntraVENous PRN Radha Hua MD        ondansetron (ZOFRAN-ODT) disintegrating tablet 4 mg  4 mg Oral Q8H PRN Radha Hua MD        Or    ondansetron [facility]) injection 4 mg  4 mg IntraVENous Q6H PRN Kelsey Calloway Jeanne Ozuna MD        polyethylene glycol (GLYCOLAX) packet 17 g  17 g Oral Daily PRN Don Cooney MD        acetaminophen (TYLENOL) tablet 650 mg  650 mg Oral Q6H PRN Don Cooney MD        Or    acetaminophen (TYLENOL) suppository 650 mg  650 mg Rectal Q6H PRN Don Cooney MD        piperacillin-tazobactam (ZOSYN) 3,375 mg in sodium chloride 0.9 % 50 mL IVPB (mini-bag)  3,375 mg IntraVENous Q8H Don Cooney MD   Stopped at 02/26/23 5798    lactated ringers IV soln infusion   IntraVENous Continuous Don Cooney  mL/hr at 02/26/23 0912 New Bag at 02/26/23 0912    insulin lispro (HUMALOG) injection vial 0-4 Units  0-4 Units SubCUTAneous TID WC Don Cooney MD        insulin lispro (HUMALOG) injection vial 0-4 Units  0-4 Units SubCUTAneous Nightly Don Cooney MD        glucose chewable tablet 16 g  4 tablet Oral PRN Don Cooney MD        dextrose bolus 10% 125 mL  125 mL IntraVENous PRN Don Cooney MD        Or    dextrose bolus 10% 250 mL  250 mL IntraVENous PRN Don Cooney MD        glucagon (rDNA) injection 1 mg  1 mg SubCUTAneous PRN Don Cooney MD        dextrose 10 % infusion   IntraVENous Continuous PRN Don Cooney MD            Objective:   BP (!) 148/67   Pulse 99   Temp 97.3 °F (36.3 °C) (Oral)   Resp 18   Ht 6' (1.829 m)   Wt 178 lb (80.7 kg)   SpO2 95%   BMI 24.14 kg/m²     Physical Exam  Vitals reviewed. Eyes:      Pupils: Pupils are equal, round, and reactive to light. Cardiovascular:      Rate and Rhythm: Normal rate and regular rhythm. Heart sounds: No murmur heard. Pulmonary:      Effort: Pulmonary effort is normal.      Breath sounds: Normal breath sounds. Abdominal:      General: Bowel sounds are normal.   Musculoskeletal:         General: Normal range of motion. Cervical back: Normal range of motion. Skin:     General: Skin is warm. Neurological:      Mental Status: He is alert. He is disoriented. Cranial Nerves: No cranial nerve deficit. Sensory: No sensory deficit. Motor: No abnormal muscle tone. Coordination: Coordination normal.      Deep Tendon Reflexes: Reflexes are normal and symmetric. Babinski sign absent on the right side. Babinski sign absent on the left side. Comments: Tremors are noted there is no parkinsonian features there is overall strength of 4 5 is areflexic throughout gait is deferred   Psychiatric:         Mood and Affect: Mood normal.   Mentation noted above with some lethargy    CTA HEAD W WO CONTRAST    Addendum Date: 2/24/2023    ADDENDUM: CTA neck now available which shows extensive atherosclerotic plaque at level of carotid bulbs and proximal right and left internal carotid arteries. 50% stenosis involving proximal right internal carotid artery. Stenosis of approximately 60% involving proximal left internal carotid artery. No stenosis involving common carotid arteries. No stenosis or dissection involving the vertebral arteries. Result Date: 2/24/2023  EXAMINATION: CTA OF THE HEAD WITHOUT AND WITH CONTRAST; CTA OF THE NECK 2/24/2023 2:31 pm TECHNIQUE: CTA of the head/brain was performed without and with the administration of intravenous contrast. Multiplanar reformatted images are provided for review. MIP images are provided for review. Automated exposure control, iterative reconstruction, and/or weight based adjustment of the mA/kV was utilized to reduce the radiation dose to as low as reasonably achievable.; CTA of the neck was performed with the administration of intravenous contrast. Multiplanar reformatted images are provided for review. MIP images are provided for review. Stenosis of the internal carotid arteries measured using NASCET criteria. Automated exposure control, iterative reconstruction, and/or weight based adjustment of the mA/kV was utilized to reduce the radiation dose to as low as reasonably achievable.  COMPARISON: None HISTORY: ORDERING SYSTEM PROVIDED HISTORY: altered mental status TECHNOLOGIST PROVIDED HISTORY: Reason for exam:->altered mental status Decision Support Exception - unselect if not a suspected or confirmed emergency medical condition->Emergency Medical Condition (MA) What reading provider will be dictating this exam?->CRC FINDINGS: CTA HEAD: ANTERIOR CIRCULATION: No significant stenosis of the intracranial internal carotid, anterior cerebral, or middle cerebral arteries. No aneurysm. POSTERIOR CIRCULATION: No significant stenosis of the vertebral, basilar, or posterior cerebral arteries. No aneurysm. OTHER: No dural venous sinus thrombosis on this non-dedicated study. No intracranial arterial stenosis. IMPRESSION: 1. 50% stenosis involving proximal right internal carotid artery. 2. 60% stenosis involving proximal left internal carotid artery. CT Head W/O Contrast    Result Date: 2/24/2023  EXAMINATION: CT OF THE HEAD WITHOUT CONTRAST  2/24/2023 2:31 pm TECHNIQUE: CT of the head was performed without the administration of intravenous contrast. Automated exposure control, iterative reconstruction, and/or weight based adjustment of the mA/kV was utilized to reduce the radiation dose to as low as reasonably achievable. COMPARISON: None. HISTORY: ORDERING SYSTEM PROVIDED HISTORY: altered mental status, fall yesterday TECHNOLOGIST PROVIDED HISTORY: Reason for exam:->altered mental status, fall yesterday Has a \"code stroke\" or \"stroke alert\" been called? ->No Decision Support Exception - unselect if not a suspected or confirmed emergency medical condition->Emergency Medical Condition (MA) What reading provider will be dictating this exam?->CRC FINDINGS: BRAIN/VENTRICLES: There is no acute intracranial hemorrhage, mass effect or midline shift. No abnormal extra-axial fluid collection. The gray-white differentiation is maintained without evidence of an acute infarct. There is no evidence of hydrocephalus.  The ventricles, cisterns and sulci are prominent consistent with atrophy. There is decreased attenuation within the periventricular white matter consistent with periventricular leukomalacia. ORBITS: The visualized portion of the orbits demonstrate no acute abnormality. SINUSES: The visualized paranasal sinuses and mastoid air cells demonstrate no acute abnormality. SOFT TISSUES/SKULL:  No acute abnormality of the visualized skull or soft tissues. 1. There is no acute intracranial abnormality. Specifically, there is no intracranial hemorrhage. 2. Atrophy and periventricular leukomalacia,     CTA NECK W WO CONTRAST    Addendum Date: 2/24/2023    ADDENDUM: CTA neck now available which shows extensive atherosclerotic plaque at level of carotid bulbs and proximal right and left internal carotid arteries. 50% stenosis involving proximal right internal carotid artery. Stenosis of approximately 60% involving proximal left internal carotid artery. No stenosis involving common carotid arteries. No stenosis or dissection involving the vertebral arteries. Result Date: 2/24/2023  EXAMINATION: CTA OF THE HEAD WITHOUT AND WITH CONTRAST; CTA OF THE NECK 2/24/2023 2:31 pm TECHNIQUE: CTA of the head/brain was performed without and with the administration of intravenous contrast. Multiplanar reformatted images are provided for review. MIP images are provided for review. Automated exposure control, iterative reconstruction, and/or weight based adjustment of the mA/kV was utilized to reduce the radiation dose to as low as reasonably achievable.; CTA of the neck was performed with the administration of intravenous contrast. Multiplanar reformatted images are provided for review. MIP images are provided for review. Stenosis of the internal carotid arteries measured using NASCET criteria. Automated exposure control, iterative reconstruction, and/or weight based adjustment of the mA/kV was utilized to reduce the radiation dose to as low as reasonably achievable.  COMPARISON: None HISTORY: ORDERING SYSTEM PROVIDED HISTORY: altered mental status TECHNOLOGIST PROVIDED HISTORY: Reason for exam:->altered mental status Decision Support Exception - unselect if not a suspected or confirmed emergency medical condition->Emergency Medical Condition (MA) What reading provider will be dictating this exam?->CRC FINDINGS: CTA HEAD: ANTERIOR CIRCULATION: No significant stenosis of the intracranial internal carotid, anterior cerebral, or middle cerebral arteries. No aneurysm. POSTERIOR CIRCULATION: No significant stenosis of the vertebral, basilar, or posterior cerebral arteries. No aneurysm. OTHER: No dural venous sinus thrombosis on this non-dedicated study. No intracranial arterial stenosis. IMPRESSION: 1. 50% stenosis involving proximal right internal carotid artery. 2. 60% stenosis involving proximal left internal carotid artery. XR CHEST PORTABLE    Result Date: 2/24/2023  EXAMINATION: ONE XRAY VIEW OF THE CHEST 2/24/2023 5:00 pm COMPARISON: None. HISTORY: ORDERING SYSTEM PROVIDED HISTORY: altered mental status TECHNOLOGIST PROVIDED HISTORY: Reason for exam:->altered mental status What reading provider will be dictating this exam?->CRC FINDINGS: The lungs are without acute focal process. Probable right lung atelectasis. There is no effusion or pneumothorax. The cardiomediastinal silhouette is without acute process. The osseous structures are without acute process. No acute process.        Lab Results   Component Value Date/Time    WBC 14.4 02/26/2023 05:15 AM    RBC 3.94 02/26/2023 05:15 AM    HGB 11.6 02/26/2023 05:15 AM    HCT 34.6 02/26/2023 05:15 AM    MCV 88.0 02/26/2023 05:15 AM    MCH 29.5 02/26/2023 05:15 AM    MCHC 33.5 02/26/2023 05:15 AM    RDW 14.7 02/26/2023 05:15 AM     02/26/2023 05:15 AM     Lab Results   Component Value Date/Time     02/26/2023 05:15 AM    K 2.8 02/26/2023 05:15 AM     02/26/2023 05:15 AM    CO2 28 02/26/2023 05:15 AM    BUN 10 02/26/2023 05:15 AM CREATININE 0.70 02/26/2023 05:15 AM    LABGLOM >60.0 02/26/2023 05:15 AM    GLUCOSE 106 02/26/2023 05:15 AM    PROT 5.4 02/26/2023 05:15 AM    LABALBU 2.8 02/26/2023 05:15 AM    CALCIUM 8.7 02/26/2023 05:15 AM    BILITOT 0.5 02/26/2023 05:15 AM    ALKPHOS 119 02/26/2023 05:15 AM    AST 18 02/26/2023 05:15 AM    ALT 11 02/26/2023 05:15 AM     Lab Results   Component Value Date/Time    PROTIME 13.6 02/24/2023 01:15 PM    INR 1.0 02/24/2023 01:15 PM     No results found for: TSH, [id number], FOLATE, FERRITIN, IRON, TIBC, PTRFSAT, RETICCOUNT, TSH, FREET4  No results found for: TRIG, HDL, LDLCALC, LDLDIRECT, LABVLDL  Lab Results   Component Value Date/Time    ETOH <10 02/24/2023 01:15 PM     Lab Results   Component Value Date/Time    VALPROATE 29.5 02/24/2023 05:05 PM       Assessment:   Vascular dementia with a fluctuating course. Patient appears to be much better today there is no session of any other focal findings or suggestion of seizures though he is at somewhat risk for seizures. We will not empirically treat him though. He appears to be his baseline is details of his dementia are not well-known to me and trying to find his family for the same. We will try and contact them and see what else has occurred. CT scan of the head and CT angiograms and cervical spine CTs were reviewed and case was discussed with the    2/26  He is somewhat lethargic as he has not slept for 2 days. There is no session of any other focal findings. Most of his findings appear to be related to his underlying medical issues and medications. We will keep an eye on this and continue to follow      Felix Gross MD, [place] Jaime, American Board of Psychiatry & Neurology  Board Certified in Vascular Neurology  Board Certified in Neuromuscular Medicine  Certified in [facility]:

## 2023-02-26 NOTE — PROGRESS NOTES
Hospitalist Progress Note      PCP: Moshe Mathew MD    Date of Admission: 2/24/2023    Chief Complaint:    Chief Complaint   Patient presents with    Altered Mental Status     Subjective:  Patient has dementia; unable to accurately complete full 12 point ROS    Medications:  Reviewed    Infusion Medications    sodium chloride      lactated ringers IV soln 100 mL/hr at 02/26/23 0912    dextrose       Scheduled Medications    apixaban  2.5 mg Oral BID    melatonin  3 mg Oral Nightly    tamsulosin  0.4 mg Oral Nightly    aspirin  81 mg Oral Daily    allopurinol  300 mg Oral Daily    divalproex  250 mg Oral BID    sodium chloride flush  5-40 mL IntraVENous 2 times per day    piperacillin-tazobactam  3,375 mg IntraVENous Q8H    insulin lispro  0-4 Units SubCUTAneous TID WC    insulin lispro  0-4 Units SubCUTAneous Nightly     PRN Meds: potassium chloride **OR** potassium alternative oral replacement **OR** potassium chloride, [Held by provider] haloperidol lactate, sodium chloride flush, sodium chloride, ondansetron **OR** ondansetron, polyethylene glycol, acetaminophen **OR** acetaminophen, glucose, dextrose bolus **OR** dextrose bolus, glucagon (rDNA), dextrose      Intake/Output Summary (Last 24 hours) at 2/26/2023 1509  Last data filed at 2/26/2023 0528  Gross per 24 hour   Intake 1750 ml   Output --   Net 1750 ml         Exam:    BP (!) 148/67   Pulse 99   Temp 97.3 °F (36.3 °C) (Oral)   Resp 18   Ht 6' (1.829 m)   Wt 178 lb (80.7 kg)   SpO2 95%   BMI 24.14 kg/m²     General appearance: No apparent distress, appears stated age and cooperative. HEENT:  Conjunctivae/corneas clear. Neck: Trachea midline. Respiratory:  Normal respiratory effort. Clear to auscultation  Cardiovascular: Regular rate and rhythm  Abdomen: Soft, non-tender, non-distended with normal bowel sounds.   Musculoskeletal: No clubbing, cyanosis or edema bilaterally  Neuro: Non Focal.   Capillary Refill: Brisk,< 3 seconds   Peripheral Pulses: +2 palpable, equal bilaterally       Labs:   Recent Labs     02/24/23  1315 02/24/23  1627 02/25/23  0623 02/26/23  0515   WBC 23.8*  --  21.0* 14.4*   HGB 14.5 14.6 13.3* 11.6*   HCT 44.1  --  39.9* 34.6*     --  314 279       Recent Labs     02/24/23  1315 02/24/23  1627 02/25/23  0623 02/26/23  0515     --  143 141   K 3.9  --  3.7 2.8*   CL 98  --  108* 102   CO2 27  --  24 28   BUN 18  --  16 10   CREATININE 1.02 1.0 0.87 0.70   CALCIUM 9.3  --  8.8 8.7       Recent Labs     02/24/23  1315 02/25/23  0623 02/26/23  0515   AST 28 19 18   ALT 16 13 11   BILITOT 0.5 0.6 0.5   ALKPHOS 146* 126* 119*       Recent Labs     02/24/23  1315   INR 1.0       Recent Labs     02/24/23  1315 02/24/23  1705   TROPONINI 0.017* 0.014*         Urinalysis:      Lab Results   Component Value Date/Time    NITRU Negative 02/24/2023 01:15 PM    BLOODU Negative 02/24/2023 01:15 PM    SPECGRAV 1.020 02/24/2023 01:15 PM    GLUCOSEU Negative 02/24/2023 01:15 PM       Radiology:  XR HIP RIGHT (2-3 VIEWS)   Final Result   No acute osseous abnormality. Mild right hip DJD. XR CHEST PORTABLE   Final Result   No acute process. CT Head W/O Contrast   Final Result   1. There is no acute intracranial abnormality. Specifically, there is no   intracranial hemorrhage. 2. Atrophy and periventricular leukomalacia,         CTA HEAD W WO CONTRAST   Final Result   Addendum (preliminary) 1 of 1   ADDENDUM:   CTA neck now available which shows extensive atherosclerotic plaque at    level   of carotid bulbs and proximal right and left internal carotid arteries. 50%   stenosis involving proximal right internal carotid artery. Stenosis of   approximately 60% involving proximal left internal carotid artery. No   stenosis involving common carotid arteries. No stenosis or dissection   involving the vertebral arteries. Final   No intracranial arterial stenosis.          IMPRESSION:   1. 50% stenosis involving proximal right internal carotid artery. 2. 60% stenosis involving proximal left internal carotid artery. CTA NECK W WO CONTRAST   Final Result   Addendum (preliminary) 1 of 1   ADDENDUM:   CTA neck now available which shows extensive atherosclerotic plaque at    level   of carotid bulbs and proximal right and left internal carotid arteries. 50%   stenosis involving proximal right internal carotid artery. Stenosis of   approximately 60% involving proximal left internal carotid artery. No   stenosis involving common carotid arteries. No stenosis or dissection   involving the vertebral arteries. Final   No intracranial arterial stenosis. IMPRESSION:   1. 50% stenosis involving proximal right internal carotid artery. 2. 60% stenosis involving proximal left internal carotid artery. Assessment/Plan:    #Dementia with intermittent encephalopathy    - possibly sundowning    - neuro consulted for their opinion as well as psych    - doing well with haldol on hold; on depakote sprinkles now    #Leucocytosis    - Continue zosyn for now; improving; procal negative; ID is considering stopping abx    #human metapneumovirus    - appears improved; doing well      Active Hospital Problems    Diagnosis Date Noted    Leukocytosis [D72.829] 02/25/2023     Priority: Medium    Human metapneumovirus (hMPV) pneumonia [J12.3] 02/25/2023     Priority: Medium    AMS (altered mental status) [R41.82] 02/24/2023     Priority: Medium        Additional work up or/and treatment plan may be added today or then after based on clinical progression. I am managing a portion of pt care. Some medical issues are handled by other specialists. Additional work up and treatment should be done in out pt setting by pt PCP and other out pt providers. In addition to examining and evaluating pt, I spent additional time explaining care, normal and abnormal findings, and treatment plan.  All of pt questions were answered. Counseling, diet and education were  provided. Case will be discussed with nursing staff when appropriate. Family will be updated if and when appropriate. Diet: ADULT DIET;  Regular; 4 carb choices (60 gm/meal)    Code Status: Full Code    PT/OT Eval     Electronically signed by Rhea Sandhu MD on 2/26/2023 at 3:09 PM

## 2023-03-01 LAB
BLOOD CULTURE, ROUTINE: NORMAL
CULTURE, BLOOD 2: NORMAL
EKG ATRIAL RATE: 111 BPM
EKG ATRIAL RATE: 113 BPM
EKG P AXIS: 23 DEGREES
EKG P AXIS: 53 DEGREES
EKG P-R INTERVAL: 136 MS
EKG P-R INTERVAL: 138 MS
EKG Q-T INTERVAL: 338 MS
EKG Q-T INTERVAL: 342 MS
EKG QRS DURATION: 106 MS
EKG QRS DURATION: 94 MS
EKG QTC CALCULATION (BAZETT): 459 MS
EKG QTC CALCULATION (BAZETT): 469 MS
EKG R AXIS: -15 DEGREES
EKG R AXIS: -21 DEGREES
EKG T AXIS: 14 DEGREES
EKG T AXIS: 26 DEGREES
EKG VENTRICULAR RATE: 111 BPM
EKG VENTRICULAR RATE: 113 BPM